# Patient Record
Sex: FEMALE | Race: BLACK OR AFRICAN AMERICAN | NOT HISPANIC OR LATINO | Employment: FULL TIME | URBAN - METROPOLITAN AREA
[De-identification: names, ages, dates, MRNs, and addresses within clinical notes are randomized per-mention and may not be internally consistent; named-entity substitution may affect disease eponyms.]

---

## 2017-02-23 ENCOUNTER — ALLSCRIPTS OFFICE VISIT (OUTPATIENT)
Dept: OTHER | Facility: OTHER | Age: 32
End: 2017-02-23

## 2018-01-10 NOTE — RESULT NOTES
Verified Results  * [facility]PAT[facility] Carlos Alberto PEDRO [id number] 09:07AM Preet Celestin Order Number: [id number]    - Patient Instructions: To schedule this appointment, please contact Central Scheduling at [phone]  Test Name Result Flag Reference   [facility] HEPATOBILIARY W RX (Report)     HEPATOBILIARY SCAN WITH CHOLECYSTOKININ ADMINISTRATION      INDICATION: Right upper quadrant pain, nausea, vomiting     COMPARISON: None available     TECHNIQUE:  Following the intravenous administration of 5 4 mCi Tc-99m labeled mebrofenin, dynamic abdominal images were obtained over a 60 minute time period  Images were performed in AP projection  FINDINGS: Examination is limited by significant motion  There is prompt, uniform accumulation with normal clearance of the radiopharmaceutical by the liver  There is normal filling of the intrahepatic ducts, common bile duct and gallbladder with normal excretion of the radiopharmaceutical into the duodenum  In order to evaluate the contractile response of the gallbladder to cholecystokinin stimulation, 2 3 mcg sincalide (0 02 mcg/kg) was administered by slow intravenous infusion over 60 minutes  These images appear to demonstrate normal contraction of    the gallbladder  The calculated gallbladder ejection fraction is 73 % (N = >35%)  The patient experienced no symptoms after CCK administration  IMPRESSION:     1  Normal hepatobiliary study   2   There is significant motion, however there appears to be normal contractile response of the gallbladder to cholecystokinin infusion  (73%)       Workstation performed: [id number]     Signed by:   Concepción Roque MD   10/7/16

## 2018-01-10 NOTE — MISCELLANEOUS
Message  telephone call to patient to inform of pap smear results and need for f/u appt  in 1 year  Also requested refill of OCP's because she forgot to ask at her appt  Rx  for altavera called to pharmacy for 1 year supply      Active Problems    1  Abdominal pain, epigastric (789 06) (R10 13)   2  Bacterial vaginosis (616 10,041 9) (N76 0,B96 89)   3  Bleeding after intercourse (626 7) (N93 0)   4  Chlamydial infection (079 98) (A74 9)   5  Encounter for contraceptive management (V25 9) (Z30 9)   6  Encounter for routine gynecological examination (V72 31) (Z01 419)   7  GERD without esophagitis (530 81) (K21 9)   8  Obesity due to excess calories, unspecified obesity severity (278 00) (E66 09)   9  Screening for STD (sexually transmitted disease) (V74 5) (Z11 3)   10  Unprotected sexual intercourse (V69 2) (Z72 51)   11  Vaginal bleeding between periods (626 6) (N92 0)   12  Vaginal discharge (623 5) (N89 8)    Current Meds   1  BuPROPion HCl ER (SR) 150 MG Oral Tablet Extended Release 12 Hour; TAKE 1   TABLET TWICE DAILY; Therapy: 00JRF7224 to Recorded   2  Dicyclomine HCl - 20 MG Oral Tablet; TAKE 1 TABLET BY MOUTH EVERY 6 HOURS AS   NEEDED; Therapy: 23Cyj7264 to (Evaluate:13Oct2016)  Requested for: 66Ulc6004; Last   Rx:[id number] Ordered   3  Pantoprazole Sodium 40 MG Oral Tablet Delayed Release; TAKE 1 TABLET TWICE   DAILY 30 MINUTES BEFORE BREAKFAST AND DINNER; Therapy: 97Drc0486 to (Evaluate:30Oct2016)  Requested for: 99Bwm4061; Last   Rx:[id number] Ordered    Allergies    1   No Known Drug Allergies    Signatures   Electronically signed by : Mauro Cespedes RN; Oct 28 2016 10:19AM EST                       (Author)

## 2018-01-12 NOTE — MISCELLANEOUS
Reason For Visit  Reason For Visit Free Text Note Form: SW MET WITH PATIENT TO ADDRESS DEPRESSION SCORE OF 17 WITH NO HARM TO SELF U OTHERS  Case Management Documentation [facility]:   Information obtained from the patient and medical record  Patient's financial status employed  She is also dealing with additional issues such as family crisis  Patient is participating in [facility]  Action Plan: supportive counseling/advocacy, information provided and referral(s) made  Progress Note  WARNER MET WITH 29 Y/O- S- P1- AA ENGLISH SPEAKING WOMAN  PATIENT RESIDES WITH 10 Y/O SON  VERBALIZED IS GRIEVING HER DAD SUDDEN DEATH SEVERAL MONTHS AGO  PATIENT DENIES ANY USAGE OF DRUG, ALCOHOL OR SMOKING  NO MENTAL HEALTH HISTORY OR DV ISSUES  REPORTED HAS GOOD SUPPORT FROM FRIENDS AND OLDER SISTERS  PATIENT RECEPTIVE TO  EVALUATION  ON HER REQUEST ASSISTED TO SCHEDULE APPOINTMENT AT [facility] FOR 11/4 AT 9:30 AM  PATIENT VERY APPRECIATIVE WITH WARNER INTERVENTION  Active Problems    1  Abdominal pain, epigastric (789 06) (R10 13)   2  Bacterial vaginosis (616 10,041 9) (N76 0,B96 89)   3  Bleeding after intercourse (626 7) (N93 0)   4  Chlamydial infection (079 98) (A74 9)   5  Encounter for contraceptive management (V25 9) (Z30 9)   6  Encounter for routine gynecological examination (V72 31) (Z01 419)   7  GERD without esophagitis (530 81) (K21 9)   8  Obesity due to excess calories, unspecified obesity severity (278 00) (E66 09)   9  Screening for STD (sexually transmitted disease) (V74 5) (Z11 3)   10  Unprotected sexual intercourse (V69 2) (Z72 51)   11  Vaginal bleeding between periods (626 6) (N92 0)   12  Vaginal discharge (623 5) (N89 8)    Current Meds   1  BuPROPion HCl ER (SR) 150 MG Oral Tablet Extended Release 12 Hour; TAKE 1   TABLET TWICE DAILY; Therapy: 99TGK7800 to Recorded   2   Dicyclomine HCl - 20 MG Oral Tablet; TAKE 1 TABLET BY MOUTH EVERY 6 HOURS AS   NEEDED; Therapy: 20Lqj2589 to (Evaluate:13Oct2016)  Requested for: 34Vek8719; Last   Rx:[id number] Ordered   3  Pantoprazole Sodium 40 MG Oral Tablet Delayed Release; TAKE 1 TABLET TWICE DAILY   30 MINUTES BEFORE BREAKFAST AND DINNER; Therapy: 60Wnb0811 to (Evaluate:30Oct2016)  Requested for: 11Mxn4685; Last   Rx:[id number] Ordered    Allergies    1   No Known Drug Allergies    Future Appointments    Date/Time Provider Specialty Site   10/28/2016 09:45 AM [facility] 1st Year Resident, Schedule  [facility]  [address]     Signatures   Electronically signed by : DEVIN Land; Oct 14 2016 10:46AM EST                       (Author)

## 2018-01-12 NOTE — MISCELLANEOUS
Message  telephone call to patient  Informed of positive chlamydia and need for treatment  Will pick-up script for zithromax ASAP  Partner to be treated with [facility]  Has 3 month f/u appointment  Will need MERVIN at that appointment  Safe sex practices discussed  Encouraged to complete the labs ordered for additional STD testing      Active Problems    1  Bacterial vaginosis (616 10,041 9) (N76 0,B96 89)   2  Bleeding after intercourse (626 7) (N93 0)   3  Chlamydial infection (079 98) (A74 9)   4  Encounter for contraceptive management (V25 9) (Z30 9)   5  Screening for STD (sexually transmitted disease) (V74 5) (Z11 3)   6  Vaginal bleeding between periods (626 6) (N92 0)   7  Vaginal discharge (623 5) (N89 8)    Current Meds   1  Chateal 0 15-30 MG-MCG Oral Tablet; TAKE 1 TABLET DAILY; Therapy: 17WGA7343 to (Evaluate:04Oct2016)  Requested for: 58Qtt9903; Last   Rx:44Gyr5699 Ordered   2  MetroNIDAZOLE 500 MG Oral Tablet; TAKE 1 TABLET TWICE DAILY UNTIL FINISHED; Therapy: 88WJD5094 to (Evaluate:80Mdf2460)  Requested for: 36Hgh6073; Last   Rx:38Exb6033 Ordered    Allergies    1  No Known Drug Allergies    Plan  Chlamydial infection    · Azithromycin 500 MG Oral Tablet;  Take 2 tablets in single dose    Signatures   Electronically signed by : Shama Hoyos RN; Jul 18 2016 11:25AM EST                       (Author)

## 2018-01-13 VITALS
SYSTOLIC BLOOD PRESSURE: 134 MMHG | BODY MASS INDEX: 41.98 KG/M2 | HEIGHT: 68 IN | WEIGHT: 277 LBS | DIASTOLIC BLOOD PRESSURE: 83 MMHG

## 2018-01-14 NOTE — MISCELLANEOUS
Message  telephone call from patient  States condom broke during intercourse at approx  1700 yesterday  Requesting PlanB  Rx  called to pharmacy  Pt  has scheduled annual exam tomorrow      Active Problems    1  Abdominal pain, epigastric (789 06) (R10 13)   2  Bacterial vaginosis (616 10,041 9) (N76 0,B96 89)   3  Bleeding after intercourse (626 7) (N93 0)   4  Chlamydial infection (079 98) (A74 9)   5  Encounter for contraceptive management (V25 9) (Z30 9)   6  GERD without esophagitis (530 81) (K21 9)   7  Obesity due to excess calories, unspecified obesity severity (278 00) (E66 09)   8  Screening for STD (sexually transmitted disease) (V74 5) (Z11 3)   9  Unprotected sexual intercourse (V69 2) (Z72 51)   10  Vaginal bleeding between periods (626 6) (N92 0)   11  Vaginal discharge (623 5) (N89 8)    Current Meds   1  Dicyclomine HCl - 20 MG Oral Tablet; TAKE 1 TABLET BY MOUTH EVERY 6 HOURS AS   NEEDED; Therapy: 17Msy1653 to (Evaluate:13Oct2016)  Requested for: 21Aux1390; Last   Rx:66Lnn2679 Ordered   2  Monistat 7 Complete Therapy 100-2 MG-% Vaginal Kit; APPLY 1 APPLICATOR Daily; Therapy: 44Wgu7180 to (Evaluate:45Jty9299)  Requested for: 98Utp5049; Last   Rx:34Awv8468 Ordered   3  Pantoprazole Sodium 40 MG Oral Tablet Delayed Release; TAKE 1 TABLET TWICE   DAILY 30 MINUTES BEFORE BREAKFAST AND DINNER; Therapy: 71Kyp0214 to (Evaluate:30Oct2016)  Requested for: 77Jin8015; Last   Rx:90Vmi5631 Ordered   4  Plan B One-Step 1 5 MG Oral Tablet (Levonorgestrel); Therapy: 06PGO3325 to Recorded    Allergies    1   No Known Drug Allergies    Signatures   Electronically signed by : Vikki Watson RN; Oct 13 2016  9:51AM EST                       (Author)

## 2018-01-18 NOTE — MISCELLANEOUS
Message  telephone call from patient  States that partner told her he tested positive for gonorrhea and syphilis as well as chlamydia  Appointment scheduled for additional treatment/testing      Active Problems    1  Bacterial vaginosis (616 10,041 9) (N76 0,B96 89)   2  Bleeding after intercourse (626 7) (N93 0)   3  Chlamydial infection (079 98) (A74 9)   4  Encounter for contraceptive management (V25 9) (Z30 9)   5  Screening for STD (sexually transmitted disease) (V74 5) (Z11 3)   6  Vaginal bleeding between periods (626 6) (N92 0)   7  Vaginal discharge (623 5) (N89 8)    Current Meds   1  Azithromycin 500 MG Oral Tablet (Zithromax); Take two tablets orally at one time; Therapy: 18RXB6359 to (Last Rx:83Hbo5926)  Requested for: 17VFV6711 Ordered   2  Azithromycin 500 MG Oral Tablet; Take 2 tablets in single dose; Therapy: 51XUI4504 to (Last Rx:09Isx2379)  Requested for: 46LIJ2323 Ordered   3  Chateal 0 15-30 MG-MCG Oral Tablet; TAKE 1 TABLET DAILY; Therapy: 33VPX3328 to (Evaluate:04Oct2016)  Requested for: 73Afu7871; Last   Rx:45Nrg6562 Ordered   4  MetroNIDAZOLE 500 MG Oral Tablet; TAKE 1 TABLET TWICE DAILY UNTIL FINISHED; Therapy: 67GCE5237 to (Evaluate:23Rkp7826)  Requested for: 35Jig9673; Last   Rx:61Lgy7429 Ordered    Allergies    1   No Known Drug Allergies    Signatures   Electronically signed by : Pipo Webb RN; Jul 26 2016  2:59PM EST                       (Author)

## 2020-01-17 ENCOUNTER — OFFICE VISIT (OUTPATIENT)
Dept: OBGYN CLINIC | Age: 35
End: 2020-01-17
Payer: COMMERCIAL

## 2020-01-17 VITALS
SYSTOLIC BLOOD PRESSURE: 116 MMHG | BODY MASS INDEX: 41.52 KG/M2 | WEIGHT: 274 LBS | HEIGHT: 68 IN | DIASTOLIC BLOOD PRESSURE: 74 MMHG

## 2020-01-17 DIAGNOSIS — Z11.3 SCREENING FOR STDS (SEXUALLY TRANSMITTED DISEASES): ICD-10-CM

## 2020-01-17 DIAGNOSIS — Z32.02 URINE PREGNANCY TEST NEGATIVE: ICD-10-CM

## 2020-01-17 DIAGNOSIS — Z01.411 ENCOUNTER FOR GYNECOLOGICAL EXAMINATION WITH ABNORMAL FINDING: Primary | ICD-10-CM

## 2020-01-17 DIAGNOSIS — N89.8 VAGINAL ODOR: ICD-10-CM

## 2020-01-17 LAB — SL AMB POCT URINE HCG: NORMAL

## 2020-01-17 PROCEDURE — 87624 HPV HI-RISK TYP POOLED RSLT: CPT | Performed by: NURSE PRACTITIONER

## 2020-01-17 PROCEDURE — G0145 SCR C/V CYTO,THINLAYER,RESCR: HCPCS | Performed by: NURSE PRACTITIONER

## 2020-01-17 PROCEDURE — 99385 PREV VISIT NEW AGE 18-39: CPT | Performed by: NURSE PRACTITIONER

## 2020-01-17 PROCEDURE — 87491 CHLMYD TRACH DNA AMP PROBE: CPT | Performed by: NURSE PRACTITIONER

## 2020-01-17 PROCEDURE — 81025 URINE PREGNANCY TEST: CPT | Performed by: NURSE PRACTITIONER

## 2020-01-17 PROCEDURE — 87591 N.GONORRHOEAE DNA AMP PROB: CPT | Performed by: NURSE PRACTITIONER

## 2020-01-17 RX ORDER — METRONIDAZOLE 7.5 MG/G
1 GEL VAGINAL
Qty: 45 G | Refills: 0 | Status: SHIPPED | OUTPATIENT
Start: 2020-01-17 | End: 2020-01-22

## 2020-01-17 NOTE — PROGRESS NOTES
Diagnoses and all orders for this visit:    Encounter for gynecological examination with abnormal finding  -     Liquid-based pap, screening    Vaginal odor  -     metroNIDAZOLE (METROGEL) 0 75 % vaginal gel; Insert 1 application into the vagina daily at bedtime for 5 days    Urine pregnancy test negative  -     POCT urine HCG    Screening for STDs (sexually transmitted diseases)  -     Chlamydia/GC amplified DNA by PCR  -     Hepatitis B surface antigen  -     Hepatitis C antibody  -     HIV 1/2 AG-AB combo  -     RPR          Calcium/vit d inclusion in the diet discussed, call with any issues, SBE reinforced, all concerns addressed  Pleasant 36 y o  premenopausal female here for annual exam  She STARTED TO HAVE issues with midcycle bleeding since her last menses which concerns her bc she has an abnml pap she never followed up on and also has similar issue when she had chlamydia in 2016  Reports usually she has regular cycles  Last Pap ASCUS 2016,  pap today  Also having vaginal issues with odor and discharge  Denies pelvic pain  Declines a BCM for now, partner with vasectomy but she is not sure she believes him  Sexually active again for the first time in 3yrs and it was UPIC so she requests STD testing   +LIGHT smoker    Past Medical History:   Diagnosis Date    No known health problems      Past Surgical History:   Procedure Laterality Date    NO PAST SURGERIES       Family History   Problem Relation Age of Onset    Breast cancer Mother     Colon cancer Maternal Uncle     Ovarian cancer Neg Hx     Uterine cancer Neg Hx     Cervical cancer Neg Hx      Social History     Tobacco Use    Smoking status: Current Some Day Smoker    Smokeless tobacco: Never Used   Substance Use Topics    Alcohol use: Yes     Frequency: Monthly or less    Drug use: Never       Current Outpatient Medications:     metroNIDAZOLE (METROGEL) 0 75 % vaginal gel, Insert 1 application into the vagina daily at bedtime for 5 days, Disp: 45 g, Rfl: 0  There are no active problems to display for this patient  No Known Allergies    OB History    Para Term  AB Living   2 1 1   1 1   SAB TAB Ectopic Multiple Live Births           1      # Outcome Date GA Lbr David/2nd Weight Sex Delivery Anes PTL Lv   2 AB            1 Term                16 yr old child    Vitals:    20 1618   BP: 116/74   BP Location: Right arm   Patient Position: Sitting   Weight: 124 kg (274 lb)   Height: 5' 8" (1 727 m)     Body mass index is 41 66 kg/m²  Review of Systems   Constitutional: Negative for chills, fatigue, fever and unexpected weight change  Respiratory: Negative for shortness of breath  Gastrointestinal: Negative for anal bleeding, blood in stool, constipation and diarrhea  Genitourinary: Negative for difficulty urinating, dysuria and hematuria  Physical Exam   Constitutional: She appears well-developed and well-nourished  No distress  HENT:   Head: Normocephalic  Neck: Normal range of motion  Neck supple  Pulmonary: Effort normal   Breasts: bilateral without masses, skin changes or nipple discharge  Bilaterally soft and warm to touch  No areas of erythema or pain  Abdominal: Soft  Pelvic exam was performed with patient supine  No labial fusion  There is no rash, tenderness, lesion or injury on the right labia  There is no rash, tenderness, lesion or injury on the left labia  Urethral meatus does not show any tenderness, inflammation or discharge  Palpation of midline bladder without pain or discomfort  Uterus is not deviated, not enlarged, not fixed and not tender  Cervix exhibits no motion tenderness, no discharge and no friability  CERVIX WAS NOT SMOOTH, +CONTACT BLED  Right adnexum displays no mass, no tenderness and no fullness  Left adnexum displays no mass, no tenderness and no fullness  No erythema or tenderness in the vagina  No foreign body in the vagina   No signs of injury around the vagina  No vaginal discharge found  No signs of injury around the vagina or anus  Perineum without lesions, signs of injury, erythema or swelling  Lymphadenopathy:        Right: No inguinal adenopathy present  Left: No inguinal adenopathy present       LIMITED EXAM D/T OBESITY

## 2020-01-17 NOTE — PATIENT INSTRUCTIONS
Vaginal Discharge   WHAT YOU NEED TO KNOW:   Vaginal discharge is normal  It is usually clear or white and odorless  A change in the amount, smell, or color may indicate an underlying problem  Irritation, itching, or burning may also be a sign of a problem  Infection, a foreign body, or chemical irritants can cause abnormal vaginal discharge  Birth control pills, creams, or jellies may also cause abnormal vaginal discharge  DISCHARGE INSTRUCTIONS:   Medicines:   · Medicines  may help fight a fungal or bacterial infection  The medicine may be given as a pill  You may instead get a cream or gel you insert into your vagina  · Take your medicine as directed  Contact your healthcare provider if you think your medicine is not helping or if you have side effects  Tell him of her if you are allergic to any medicine  Keep a list of the medicines, vitamins, and herbs you take  Include the amounts, and when and why you take them  Bring the list or the pill bottles to follow-up visits  Carry your medicine list with you in case of an emergency  Contact your healthcare provider or gynecologist if:   · Your symptoms do not get better in 3 to 5 days  · You have trouble urinating, or you urinate often and with urgency  · You have abdominal pain or cramps  · Your discharge is bloody and it is not your monthly period  · You have pain with sexual intercourse  · You have a fever or chills  · You have low back pain or side pain  · You have questions or concerns about your condition or care  Self-care:   · Clean in and around your vagina with mild soap and warm water each day  Gently dry the area after washing  · Take a sitz bath  Fill a bathtub with 4 to 6 inches of warm water  You may also use a sitz bath pan that fits over a toilet  Sit in the sitz bath for 20 minutes  Do this 2 to 3 times a day, or as directed  The warm water can help decrease pain and swelling       · Do not wear tight-fitting clothes or undergarments  These can make your symptoms worse  · Ask about douching  Douching may help decrease your symptoms  Use a solution of 5 tablespoons of white vinegar mixed with 2 liters of warm water  · Do not have sex until your symptoms go away  Have your partner wear a condom until you complete your course of medication  Follow up with your healthcare provider or gynecologist in 1 week:  Write down your questions so you remember to ask them during your visits  © 2017 [employer] Information is for End User's use only and may not be sold, redistributed or otherwise used for commercial purposes  All illustrations and images included in CareNotes® are the copyrighted property of A D A M [employer]  or [employer]  The above information is an  only  It is not intended as medical advice for individual conditions or treatments  Talk to your doctor, nurse or pharmacist before following any medical regimen to see if it is safe and effective for you

## 2020-01-18 ENCOUNTER — APPOINTMENT (OUTPATIENT)
Dept: LAB | Facility: CLINIC | Age: 35
End: 2020-01-18
Payer: COMMERCIAL

## 2020-01-18 LAB
HBV SURFACE AG SER QL: NORMAL
HCV AB SER QL: NORMAL

## 2020-01-18 PROCEDURE — 87340 HEPATITIS B SURFACE AG IA: CPT | Performed by: NURSE PRACTITIONER

## 2020-01-18 PROCEDURE — 36415 COLL VENOUS BLD VENIPUNCTURE: CPT | Performed by: NURSE PRACTITIONER

## 2020-01-18 PROCEDURE — 86592 SYPHILIS TEST NON-TREP QUAL: CPT | Performed by: NURSE PRACTITIONER

## 2020-01-18 PROCEDURE — 87389 HIV-1 AG W/HIV-1&-2 AB AG IA: CPT | Performed by: NURSE PRACTITIONER

## 2020-01-18 PROCEDURE — 86803 HEPATITIS C AB TEST: CPT | Performed by: NURSE PRACTITIONER

## 2020-01-19 LAB — HIV 1+2 AB+HIV1 P24 AG SERPL QL IA: NORMAL

## 2020-01-20 LAB
HPV HR 12 DNA CVX QL NAA+PROBE: NEGATIVE
HPV16 DNA CVX QL NAA+PROBE: POSITIVE
HPV18 DNA CVX QL NAA+PROBE: NEGATIVE
RPR SER QL: NORMAL

## 2020-01-21 LAB
C TRACH DNA SPEC QL NAA+PROBE: NEGATIVE
N GONORRHOEA DNA SPEC QL NAA+PROBE: NEGATIVE

## 2020-01-22 ENCOUNTER — TELEPHONE (OUTPATIENT)
Dept: OBGYN CLINIC | Facility: CLINIC | Age: 35
End: 2020-01-22

## 2020-01-22 LAB
LAB AP GYN PRIMARY INTERPRETATION: NORMAL
Lab: NORMAL

## 2020-01-22 NOTE — TELEPHONE ENCOUNTER
----- Message from Laura Ramey, [address] Shetty sent at 1/22/2020 12:58 PM EST -----  Please arrange for colposcopy, pap was normal but HPV 16+  Thanks

## 2020-01-22 NOTE — TELEPHONE ENCOUNTER
Spoke with Pt today via phone call  Pt informed that her recent Pap smear test result was "normal", however, HPV test was positive for high risk strain 16 per SEBASTIEN Branch's review of said result  Pt informed of [facility] protocol regarding colposcopy procedure  Pt scheduled for colposcopy with Dr Yoandy Fajardo on 2/4/2020 @ 8:00 am [facility]), instructed to arrive by 7:45 am (Pt expecting menstrual cycle on 1/25/2020)  Pt advised to take OTC Tylenol/Ibuprofen  with a meal 1 hour before procedure to ease cramping pain  [facility] colposcopy letter mailed today to Pt's mailing address in EHR

## 2020-02-04 ENCOUNTER — PROCEDURE VISIT (OUTPATIENT)
Dept: OBGYN CLINIC | Age: 35
End: 2020-02-04
Payer: COMMERCIAL

## 2020-02-04 VITALS
WEIGHT: 284 LBS | HEIGHT: 68 IN | DIASTOLIC BLOOD PRESSURE: 80 MMHG | SYSTOLIC BLOOD PRESSURE: 130 MMHG | BODY MASS INDEX: 43.04 KG/M2

## 2020-02-04 DIAGNOSIS — R87.618 PAP SMEAR ABNORMALITY OF CERVIX/HUMAN PAPILLOMAVIRUS (HPV) POSITIVE: Primary | ICD-10-CM

## 2020-02-04 PROCEDURE — 57454 BX/CURETT OF CERVIX W/SCOPE: CPT | Performed by: STUDENT IN AN ORGANIZED HEALTH CARE EDUCATION/TRAINING PROGRAM

## 2020-02-04 PROCEDURE — 88305 TISSUE EXAM BY PATHOLOGIST: CPT | Performed by: PATHOLOGY

## 2020-02-04 NOTE — PROGRESS NOTES
Pt with history ASCUS, did not follow up appropriately  Now with NILM/HPV 16+ Pap, so for colposcopy     Colposcopy  Date/Time: 2/4/2020 8:35 AM  Performed by: Gisela Julien MD  Authorized by: Gisela Julien MD     Consent:     Consent obtained:  Verbal    Consent given by:  Patient    Procedural risks discussed:  Bleeding, damage to other organs, failure rate, infection and repeat procedure    Patient questions answered: yes      Patient agrees, verbalizes understanding, and wants to proceed: yes      Educational handouts given: yes      Instructions and paperwork completed: yes    Universal protocol:     Patient states understanding of procedure being performed: yes    Pre-procedure:     Pre-procedure timeout performed: yes      Prepped with: acetic acid    Indication:     Indications: as above  Procedure:     Procedure: Colposcopy w/ cervical biopsy and ECC      [place] speculum was placed in the vagina: yes      Under colposcopic examination the transition zone was seen in entirety: yes      Endocervix was curetted using a Kevorkian curette: yes      Cervical biopsy performed with a cervical biopsy punch: yes      Monsel's solution was applied: yes      Biopsy(s): yes      Location:  12, 6, 8, ECC    Specimen to pathology: yes    Post-procedure:     Impression: Low grade cervical dysplasia      Patient tolerance of procedure: Tolerated well, no immediate complications    With metaplasia visible, minimal acetowhite changes, but with punctation at 12, 6, 8 o'clock   Impression CIN2

## 2020-02-11 ENCOUNTER — TELEPHONE (OUTPATIENT)
Dept: OBGYN CLINIC | Facility: CLINIC | Age: 35
End: 2020-02-11

## 2020-02-11 NOTE — TELEPHONE ENCOUNTER
----- Message from Sai Oliva MD sent at 2/11/2020 12:44 PM EST -----  Hi, could you please call Tera Vega to let her know that her result was normal, that she will need a repeat Pap in 1 year, thank you!

## 2021-07-28 ENCOUNTER — APPOINTMENT (OUTPATIENT)
Dept: PHYSICAL THERAPY | Facility: MEDICAL CENTER | Age: 36
End: 2021-07-28

## 2021-07-28 PROCEDURE — 97530 THERAPEUTIC ACTIVITIES: CPT | Performed by: PHYSICAL THERAPIST

## 2022-04-14 ENCOUNTER — OFFICE VISIT (OUTPATIENT)
Dept: OBGYN CLINIC | Facility: MEDICAL CENTER | Age: 37
End: 2022-04-14
Payer: COMMERCIAL

## 2022-04-14 VITALS — DIASTOLIC BLOOD PRESSURE: 74 MMHG | BODY MASS INDEX: 41.97 KG/M2 | WEIGHT: 276 LBS | SYSTOLIC BLOOD PRESSURE: 122 MMHG

## 2022-04-14 DIAGNOSIS — N76.0 BV (BACTERIAL VAGINOSIS): Primary | ICD-10-CM

## 2022-04-14 DIAGNOSIS — Z11.3 SCREENING FOR STDS (SEXUALLY TRANSMITTED DISEASES): ICD-10-CM

## 2022-04-14 DIAGNOSIS — B96.89 BV (BACTERIAL VAGINOSIS): Primary | ICD-10-CM

## 2022-04-14 PROBLEM — N64.52 BREAST DISCHARGE: Status: ACTIVE | Noted: 2017-02-23

## 2022-04-14 PROBLEM — G43.109 MIGRAINE WITH AURA AND WITHOUT STATUS MIGRAINOSUS, NOT INTRACTABLE: Status: ACTIVE | Noted: 2020-08-18

## 2022-04-14 PROBLEM — H53.9 VISION DISTURBANCE: Status: ACTIVE | Noted: 2020-08-18

## 2022-04-14 PROBLEM — G56.03 BILATERAL CARPAL TUNNEL SYNDROME: Status: ACTIVE | Noted: 2020-08-18

## 2022-04-14 PROBLEM — R90.89 MRI OF BRAIN ABNORMAL: Status: ACTIVE | Noted: 2020-08-18

## 2022-04-14 PROBLEM — R76.8 POSITIVE ANA (ANTINUCLEAR ANTIBODY): Status: ACTIVE | Noted: 2021-02-19

## 2022-04-14 PROBLEM — F95.9 TIC DISORDER: Status: ACTIVE | Noted: 2020-08-18

## 2022-04-14 PROCEDURE — 87591 N.GONORRHOEAE DNA AMP PROB: CPT | Performed by: OBSTETRICS & GYNECOLOGY

## 2022-04-14 PROCEDURE — 87491 CHLMYD TRACH DNA AMP PROBE: CPT | Performed by: OBSTETRICS & GYNECOLOGY

## 2022-04-14 PROCEDURE — 99213 OFFICE O/P EST LOW 20 MIN: CPT | Performed by: OBSTETRICS & GYNECOLOGY

## 2022-04-14 RX ORDER — FLUCONAZOLE 150 MG/1
TABLET ORAL
Qty: 2 TABLET | Refills: 0 | Status: SHIPPED | OUTPATIENT
Start: 2022-04-14 | End: 2022-04-18

## 2022-04-14 RX ORDER — FAMOTIDINE 40 MG/1
1 TABLET, FILM COATED ORAL DAILY
COMMUNITY
Start: 2022-03-28 | End: 2022-07-11 | Stop reason: SDUPTHER

## 2022-04-14 RX ORDER — METRONIDAZOLE 7.5 MG/G
1 GEL VAGINAL
Qty: 45 G | Refills: 0 | Status: SHIPPED | OUTPATIENT
Start: 2022-04-14 | End: 2022-04-19

## 2022-04-14 RX ORDER — METHOCARBAMOL 750 MG/1
750 TABLET, FILM COATED ORAL 4 TIMES DAILY PRN
COMMUNITY
Start: 2021-11-11

## 2022-04-14 RX ORDER — FUROSEMIDE 40 MG/1
1 TABLET ORAL DAILY
COMMUNITY
Start: 2021-12-27

## 2022-04-14 RX ORDER — DULOXETIN HYDROCHLORIDE 30 MG/1
30 CAPSULE, DELAYED RELEASE ORAL DAILY
COMMUNITY
Start: 2021-07-20 | End: 2022-07-27

## 2022-04-14 NOTE — PATIENT INSTRUCTIONS
Perineal Hygiene     No soaps or feminine wash to the vulva  Use only water to cleanse, or water with Dove or [employer] if necessary  No lotion to the area  Use only coconut oil for moisture if needed   No douching     Cotton underware, loose fitting clothing  Only perfume-free, dye-free laundry detergent, use a second rinse cycle   Avoid fabric softeners/dryer sheets  Coconut oil as a lubricant (if not using condoms) or another scent-free lubricant (Astroglide, Uberlube) if needed  Partner to avoid the same products as well  Over the counter probiotic to restore vaginal meet may be helpful as well     You may also look into Boric Acid vaginal suppositories to restore vaginal PH balance for up to 2 weeks as directed on the box  You may not use these if you are pregnant    Bacterial Vaginosis   AMBULATORY CARE:   Bacterial vaginosis  is an infection in the vagina  It may cause vaginitis (irritation and inflammation of the vagina)  The cause is not known  Bacteria normally found in the vagina are imbalanced  Your risk increases if you are sexually active, you use a douche, or you have an intrauterine device (IUD)  Common signs and symptoms of bacterial vaginosis:   · White, gray, or yellow vaginal discharge    · Vaginal discharge that smells like fish    · Itching or burning around the outside of your vagina    Call your doctor or gynecologist if:   · Your symptoms come back or do not improve with treatment  · You have vaginal bleeding that is not your monthly period  · You have questions or concerns about your condition or care  Antibiotics  are given to kill the bacteria  They may be given as a pill or as a cream to put in your vagina  Bacterial vaginosis and pregnancy:  If you have bacterial vaginosis during pregnancy, your baby may be born early or have a low birth weight   Your healthcare provider may recommend testing for bacterial vaginosis before or during your pregnancy  He or she will talk to you about your risk for premature delivery, and make sure you know the benefits and risks of testing  Prevent bacterial vaginosis:   · Keep your vaginal area clean and dry  Wear underwear and pantyhose with a cotton crotch  Wipe from front to back after you urinate or have a bowel movement  After you bathe, rinse soap from your vaginal area to decrease your risk for irritation  · Do not use products that cause irritation  Always use unscented tampons or sanitary pads  Do not use feminine sprays, powders, or scented tampons  They may cause irritation and increase your risk for vaginosis  Detergents and fabric softeners may also cause irritation  · Do not use a douche  This can cause an imbalance in healthy vaginal bacteria  · Use latex condoms during sex  This helps prevent another infection and keeps your partner from getting the infection  Follow up with your doctor or gynecologist as directed: Bacterial vaginosis increases the risk for several health problems, such as pelvic inflammatory disease (PID) or sexually transmitted infections  Work with your healthcare providers to schedule regular appointments to check for health problems  Write down your questions so you remember to ask them during your visits  © Copyright [employer] 2022 Information is for End User's use only and may not be sold, redistributed or otherwise used for commercial purposes  All illustrations and images included in CareNotes® are the copyrighted property of [employer]  or [facility]   The above information is an  only  It is not intended as medical advice for individual conditions or treatments  Talk to your doctor, nurse or pharmacist before following any medical regimen to see if it is safe and effective for you  Kegel Exercises for Women   AMBULATORY CARE:   Kegel exercises  help strengthen your pelvic muscles   Pelvic muscles hold your pelvic organs, such as your bladder and uterus, in place  Kegel exercises help prevent or control problems with urine incontinence (leakage)  Incontinence may be caused by pregnancy, childbirth, or menopause  Contact your healthcare provider if:   · You cannot feel your muscles tighten or relax  · You continue to leak urine  · You have questions or concerns about your condition or care  Use the correct muscles:  Pelvic muscles are the muscles you use to control urine flow  To target these muscles, stop and start the flow of urine several times  This will help you become familiar with how it feels to tighten and relax these muscles  How to do Kegel exercises:   · Empty your bladder  You may lie down, stand up, or sit down to do these exercises  When you first try to do these exercises, it may be easier if you lie down  Tighten or squeeze your pelvic muscles slowly  It may feel like you are trying to hold back urine or gas  Hold this position for 3 seconds  Relax for 3 seconds  Repeat this cycle 10 times  · Do 10 sets of Kegel exercises, at least 3 times a day  Do not hold your breath when you do Kegel exercises  Keep your stomach, back, and leg muscles relaxed  · As your muscles get stronger, you will be able to hold the squeeze longer  Your healthcare provider may ask that you increase your pelvic muscle squeeze to 10 seconds  After you squeeze for 10 seconds, relax for 10 seconds  What else you should know:   · Once you know how to do Kegel exercises, use different positions  You can do these exercises while you lie on the floor, sit at your desk or watch TV, and while you stand  · You may notice improved bladder control within about 6 weeks  · Tighten your pelvic muscles before you sneeze, cough, or lift to prevent urine leakage  Follow up with your doctor as directed:  Write down your questions so you remember to ask them during your visits     © Copyright [employer] 2022 Information is for End User's use only and may not be sold, redistributed or otherwise used for commercial purposes  All illustrations and images included in CareNotes® are the copyrighted property of [employer] M [employer]  or Pete Shetty  The above information is an  only  It is not intended as medical advice for individual conditions or treatments  Talk to your doctor, nurse or pharmacist before following any medical regimen to see if it is safe and effective for you

## 2022-04-14 NOTE — PROGRESS NOTES
Assessment/Plan:    No problem-specific Assessment & Plan notes found for this encounter  Diagnoses and all orders for this visit:    BV (bacterial vaginosis)  -     metroNIDAZOLE (METROGEL) 0 75 % vaginal gel; Insert 1 application into the vagina daily at bedtime for 5 days  -     fluconazole (DIFLUCAN) 150 mg tablet; Take one today and 1 in 3 days    Screening for STDs (sexually transmitted diseases)  -     Chlamydia/GC amplified DNA by PCR          Subjective:      Patient ID: Juan Antonio Durbin is a 29 y o  female  29-year-old AA female, presents for vaginal odor patient reports the odor has been on and off for a few months  Patient reports having 1 applicator of Metrogel left at home that use use 2 days ago  Patient denies itching irritation burning or abnormal discharge, denies pelvic pain fever chills or other signs of infection  Patient reports that she is not sexually active at this time  Patient reports having BV in the past   We reviewed perineal hygiene at length  Patient also reports occasional incontinence with sneeze or cough we reviewed Kegel exercises at length instructions provided in after visit summary  Patient reports that her primary medical doctor told her that she should follow-up with gyn in regards to lab work findings that could cause her pain in the future  HSV 1&2 IGM titers weakly positive  HSV 2 IgM 23 6 we discussed these findings  Patient was advised if she has ever been in contact with anyone who has had the HSV virus that it is probable she would test positive as well  Patient advised that does not mean she will have a genital outbreak    We reviewed symptoms of an outbreak she was advised to return to the office this ever occurs for treatment         The following portions of the patient's history were reviewed and updated as appropriate: allergies, current medications, past family history, past medical history, past social history, past surgical history and problem list     Review of Systems   Constitutional: Negative for chills, fatigue and fever  Eyes: Negative for visual disturbance  Respiratory: Negative for cough and shortness of breath  Cardiovascular: Negative for chest pain  Gastrointestinal: Negative for abdominal pain  Genitourinary: Negative for vaginal bleeding and vaginal discharge  Objective:      /74 (BP Location: Left arm, Patient Position: Sitting, Cuff Size: Large)   Wt 125 kg (276 lb)   BMI 41 97 kg/m²          Physical Exam  Vitals and nursing note reviewed  Constitutional:       Appearance: Normal appearance  She is normal weight  HENT:      Head: Normocephalic and atraumatic  Eyes:      Conjunctiva/sclera: Conjunctivae normal    Cardiovascular:      Rate and Rhythm: Normal rate  Pulmonary:      Effort: Pulmonary effort is normal    Genitourinary:     General: Normal vulva  Exam position: Lithotomy position  Taco stage (genital): 5  Labia:         Right: No rash, tenderness, lesion or injury  Left: No rash, tenderness, lesion or injury  Vagina: Normal       Cervix: Normal       Uterus: Normal        Adnexa: Right adnexa normal and left adnexa normal    Musculoskeletal:         General: Normal range of motion  Cervical back: Normal range of motion  Lymphadenopathy:      Lower Body: No right inguinal adenopathy  No left inguinal adenopathy  Skin:     General: Skin is warm and dry  Neurological:      Mental Status: She is alert  Psychiatric:         Mood and Affect: Mood normal          Behavior: Behavior normal          Thought Content: Thought content normal          Judgment: Judgment normal        Metrogel and Diflucan prescribed for BV reviewed instructions for use    Return to the office if symptoms persist  Return for annual exams

## 2022-04-15 LAB
C TRACH DNA SPEC QL NAA+PROBE: NEGATIVE
N GONORRHOEA DNA SPEC QL NAA+PROBE: NEGATIVE

## 2022-04-19 ENCOUNTER — TELEPHONE (OUTPATIENT)
Dept: OBGYN CLINIC | Facility: CLINIC | Age: 37
End: 2022-04-19

## 2022-04-19 DIAGNOSIS — Z20.6 HIV EXPOSURE: ICD-10-CM

## 2022-04-19 DIAGNOSIS — Z72.51 UNPROTECTED SEX: Primary | ICD-10-CM

## 2022-04-19 RX ORDER — EMTRICITABINE AND TENOFOVIR DISOPROXIL FUMARATE 200; 300 MG/1; MG/1
1 TABLET, FILM COATED ORAL DAILY
Qty: 28 TABLET | Refills: 0 | Status: SHIPPED | OUTPATIENT
Start: 2022-04-19 | End: 2022-07-11 | Stop reason: ALTCHOICE

## 2022-04-19 RX ORDER — LEVONORGESTREL 1.5 MG/1
1.5 TABLET ORAL ONCE
Qty: 1 TABLET | Refills: 0 | Status: SHIPPED | OUTPATIENT
Start: 2022-04-19 | End: 2022-04-19
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
## 2022-05-05 ENCOUNTER — OFFICE VISIT (OUTPATIENT)
Dept: OBGYN CLINIC | Facility: MEDICAL CENTER | Age: 37
End: 2022-05-05
Payer: COMMERCIAL

## 2022-05-05 ENCOUNTER — APPOINTMENT (OUTPATIENT)
Dept: LAB | Facility: MEDICAL CENTER | Age: 37
End: 2022-05-05
Payer: COMMERCIAL

## 2022-05-05 VITALS
WEIGHT: 274 LBS | HEIGHT: 68 IN | DIASTOLIC BLOOD PRESSURE: 70 MMHG | BODY MASS INDEX: 41.52 KG/M2 | SYSTOLIC BLOOD PRESSURE: 124 MMHG

## 2022-05-05 DIAGNOSIS — N76.0 BV (BACTERIAL VAGINOSIS): Primary | ICD-10-CM

## 2022-05-05 DIAGNOSIS — B96.89 BV (BACTERIAL VAGINOSIS): Primary | ICD-10-CM

## 2022-05-05 DIAGNOSIS — Z11.3 SCREENING FOR STDS (SEXUALLY TRANSMITTED DISEASES): ICD-10-CM

## 2022-05-05 DIAGNOSIS — Z20.2 STD EXPOSURE: ICD-10-CM

## 2022-05-05 LAB
HBV SURFACE AG SER QL: NORMAL
HCV AB SER QL: NORMAL
RPR SER QL: NORMAL

## 2022-05-05 PROCEDURE — 36415 COLL VENOUS BLD VENIPUNCTURE: CPT

## 2022-05-05 PROCEDURE — 87591 N.GONORRHOEAE DNA AMP PROB: CPT | Performed by: OBSTETRICS & GYNECOLOGY

## 2022-05-05 PROCEDURE — 87340 HEPATITIS B SURFACE AG IA: CPT

## 2022-05-05 PROCEDURE — 87389 HIV-1 AG W/HIV-1&-2 AB AG IA: CPT

## 2022-05-05 PROCEDURE — 86803 HEPATITIS C AB TEST: CPT

## 2022-05-05 PROCEDURE — 99213 OFFICE O/P EST LOW 20 MIN: CPT | Performed by: OBSTETRICS & GYNECOLOGY

## 2022-05-05 PROCEDURE — 86592 SYPHILIS TEST NON-TREP QUAL: CPT

## 2022-05-05 PROCEDURE — 87491 CHLMYD TRACH DNA AMP PROBE: CPT | Performed by: OBSTETRICS & GYNECOLOGY

## 2022-05-05 RX ORDER — METRONIDAZOLE 7.5 MG/G
1 GEL VAGINAL
Qty: 45 G | Refills: 0 | Status: SHIPPED | OUTPATIENT
Start: 2022-05-05 | End: 2022-05-10

## 2022-05-05 RX ORDER — FLUCONAZOLE 150 MG/1
TABLET ORAL
Qty: 2 TABLET | Refills: 0 | Status: SHIPPED | OUTPATIENT
Start: 2022-05-05 | End: 2022-05-08
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
## 2022-05-05 NOTE — PATIENT INSTRUCTIONS
Yeast Infection   AMBULATORY CARE:   A yeast infection , or vaginal candidiasis, is a common vaginal infection  A yeast infection is caused by a fungus, or yeast-like germ  Fungi are normally found in your vagina  Too many fungi can cause an infection  Common signs and symptoms:   · Thick, white, cheese-like discharge from your vagina    · Itching, swelling, and redness in your vagina    · Pain or burning when you urinate    · Pain during sexual intercourse    Call your doctor or gynecologist if:   · You have a fever and chills  · You develop abdominal or pelvic pain  · Your discharge is bloody and it is not your monthly period  · Your signs and symptoms get worse, even after treatment  · You have questions or concerns about your condition or care  Treatment for a yeast infection  includes medicines to treat the fungal infection and decrease inflammation  The medicine may be a pill, cream, ointment, or vaginal tablet or suppository  Keep your vagina healthy:   · Clean your genital area with mild soap and warm water each day  Do not get soap inside your vagina  Gently dry the area after washing  Do not use hot tubs  The heat and moisture from hot tubs can increase your risk for another yeast infection  · Always wipe from front to back  after you use the toilet  This prevents spreading bacteria from your rectal area into your vagina  · Do not wear tight-fitting clothes or undergarments  for long periods of time  Wear cotton underwear during the day  Cotton helps keep your genital area dry and does not hold in warmth or moisture  Do not wear underwear at night  · Do not douche  or use feminine hygiene sprays or bubble bath  Do not use pads or tampons that are scented, or colored or perfumed toilet paper  · Do not have sex until your symptoms go away  Have your partner wear a condom until you complete your course of medication      · Ask your healthcare provider about birth control options if necessary  Condoms have latex and diaphragms have gel that kills sperm  Both of these may irritate your genital area  Follow up with your doctor or gynecologist as directed:  Write down your questions so you remember to ask them during your visits  © Copyright [employer] 2022 Information is for End User's use only and may not be sold, redistributed or otherwise used for commercial purposes  All illustrations and images included in CareNotes® are the copyrighted property of [employer] M [employer] Inc  or Pete Shetty  The above information is an  only  It is not intended as medical advice for individual conditions or treatments  Talk to your doctor, nurse or pharmacist before following any medical regimen to see if it is safe and effective for you  Bacterial Vaginosis   AMBULATORY CARE:   Bacterial vaginosis  is an infection in the vagina  It may cause vaginitis (irritation and inflammation of the vagina)  The cause is not known  Bacteria normally found in the vagina are imbalanced  Your risk increases if you are sexually active, you use a douche, or you have an intrauterine device (IUD)  Common signs and symptoms of bacterial vaginosis:   · White, gray, or yellow vaginal discharge    · Vaginal discharge that smells like fish    · Itching or burning around the outside of your vagina    Call your doctor or gynecologist if:   · Your symptoms come back or do not improve with treatment  · You have vaginal bleeding that is not your monthly period  · You have questions or concerns about your condition or care  Antibiotics  are given to kill the bacteria  They may be given as a pill or as a cream to put in your vagina  Bacterial vaginosis and pregnancy:  If you have bacterial vaginosis during pregnancy, your baby may be born early or have a low birth weight  Your healthcare provider may recommend testing for bacterial vaginosis before or during your pregnancy   He or she will talk to you about your risk for premature delivery, and make sure you know the benefits and risks of testing  Prevent bacterial vaginosis:   · Keep your vaginal area clean and dry  Wear underwear and pantyhose with a cotton crotch  Wipe from front to back after you urinate or have a bowel movement  After you bathe, rinse soap from your vaginal area to decrease your risk for irritation  · Do not use products that cause irritation  Always use unscented tampons or sanitary pads  Do not use feminine sprays, powders, or scented tampons  They may cause irritation and increase your risk for vaginosis  Detergents and fabric softeners may also cause irritation  · Do not use a douche  This can cause an imbalance in healthy vaginal bacteria  · Use latex condoms during sex  This helps prevent another infection and keeps your partner from getting the infection  Follow up with your doctor or gynecologist as directed: Bacterial vaginosis increases the risk for several health problems, such as pelvic inflammatory disease (PID) or sexually transmitted infections  Work with your healthcare providers to schedule regular appointments to check for health problems  Write down your questions so you remember to ask them during your visits  © Copyright [employer] 2022 Information is for End User's use only and may not be sold, redistributed or otherwise used for commercial purposes  All illustrations and images included in CareNotes® are the copyrighted property of [employer]  or Pete Shetty  The above information is an  only  It is not intended as medical advice for individual conditions or treatments  Talk to your doctor, nurse or pharmacist before following any medical regimen to see if it is safe and effective for you

## 2022-05-06 LAB
C TRACH DNA SPEC QL NAA+PROBE: NEGATIVE
HIV 1+2 AB+HIV1 P24 AG SERPL QL IA: NORMAL
N GONORRHOEA DNA SPEC QL NAA+PROBE: NEGATIVE

## 2022-06-29 ENCOUNTER — HOSPITAL ENCOUNTER (EMERGENCY)
Facility: HOSPITAL | Age: 37
Discharge: HOME | End: 2022-06-29
Attending: EMERGENCY MEDICINE
Payer: COMMERCIAL

## 2022-06-29 ENCOUNTER — APPOINTMENT (EMERGENCY)
Dept: RADIOLOGY | Facility: HOSPITAL | Age: 37
End: 2022-06-29
Payer: COMMERCIAL

## 2022-06-29 VITALS
RESPIRATION RATE: 18 BRPM | SYSTOLIC BLOOD PRESSURE: 116 MMHG | OXYGEN SATURATION: 98 % | TEMPERATURE: 97.2 F | DIASTOLIC BLOOD PRESSURE: 74 MMHG | HEART RATE: 71 BPM

## 2022-06-29 DIAGNOSIS — R03.0 ELEVATED BLOOD PRESSURE READING: ICD-10-CM

## 2022-06-29 DIAGNOSIS — R10.9 ABDOMINAL PAIN: Primary | ICD-10-CM

## 2022-06-29 LAB
ALBUMIN SERPL BCP-MCNC: 3 G/DL (ref 3.5–5)
ALP SERPL-CCNC: 95 U/L (ref 46–116)
ALT SERPL W P-5'-P-CCNC: 21 U/L (ref 12–78)
ANION GAP SERPL CALCULATED.3IONS-SCNC: 7 MMOL/L (ref 4–13)
AST SERPL W P-5'-P-CCNC: 37 U/L (ref 5–45)
ATRIAL RATE: 62 BPM
BASOPHILS # BLD AUTO: 0.06 THOUSANDS/ΜL (ref 0–0.1)
BASOPHILS NFR BLD AUTO: 0 % (ref 0–1)
BILIRUB SERPL-MCNC: 0.62 MG/DL (ref 0.2–1)
BUN SERPL-MCNC: 11 MG/DL (ref 5–25)
CALCIUM ALBUM COR SERPL-MCNC: 9.6 MG/DL (ref 8.3–10.1)
CALCIUM SERPL-MCNC: 8.8 MG/DL (ref 8.3–10.1)
CHLORIDE SERPL-SCNC: 105 MMOL/L (ref 100–108)
CO2 SERPL-SCNC: 25 MMOL/L (ref 21–32)
CREAT SERPL-MCNC: 0.75 MG/DL (ref 0.6–1.3)
EOSINOPHIL # BLD AUTO: 0.09 THOUSAND/ΜL (ref 0–0.61)
EOSINOPHIL NFR BLD AUTO: 1 % (ref 0–6)
ERYTHROCYTE [DISTWIDTH] IN BLOOD BY AUTOMATED COUNT: 15.3 % (ref 11.6–15.1)
GFR SERPL CREATININE-BSD FRML MDRD: 103 ML/MIN/1.73SQ M
GLUCOSE SERPL-MCNC: 89 MG/DL (ref 65–140)
HCT VFR BLD AUTO: 39.3 % (ref 34.8–46.1)
HGB BLD-MCNC: 12.2 G/DL (ref 11.5–15.4)
IMM GRANULOCYTES # BLD AUTO: 0.06 THOUSAND/UL (ref 0–0.2)
IMM GRANULOCYTES NFR BLD AUTO: 0 % (ref 0–2)
LIPASE SERPL-CCNC: 84 U/L (ref 73–393)
LYMPHOCYTES # BLD AUTO: 3.35 THOUSANDS/ΜL (ref 0.6–4.47)
LYMPHOCYTES NFR BLD AUTO: 17 % (ref 14–44)
MCH RBC QN AUTO: 25.2 PG (ref 26.8–34.3)
MCHC RBC AUTO-ENTMCNC: 31 G/DL (ref 31.4–37.4)
MCV RBC AUTO: 81 FL (ref 82–98)
MONOCYTES # BLD AUTO: 1.03 THOUSAND/ΜL (ref 0.17–1.22)
MONOCYTES NFR BLD AUTO: 5 % (ref 4–12)
NEUTROPHILS # BLD AUTO: 14.65 THOUSANDS/ΜL (ref 1.85–7.62)
NEUTS SEG NFR BLD AUTO: 77 % (ref 43–75)
NRBC BLD AUTO-RTO: 0 /100 WBCS
P AXIS: 32 DEGREES
PLATELET # BLD AUTO: 383 THOUSANDS/UL (ref 149–390)
PMV BLD AUTO: 10.6 FL (ref 8.9–12.7)
POTASSIUM SERPL-SCNC: 5.5 MMOL/L (ref 3.5–5.3)
PR INTERVAL: 154 MS
PROT SERPL-MCNC: 8.3 G/DL (ref 6.4–8.2)
QRS AXIS: 69 DEGREES
QRSD INTERVAL: 92 MS
QT INTERVAL: 420 MS
QTC INTERVAL: 426 MS
RBC # BLD AUTO: 4.84 MILLION/UL (ref 3.81–5.12)
SODIUM SERPL-SCNC: 137 MMOL/L (ref 136–145)
T WAVE AXIS: 43 DEGREES
VENTRICULAR RATE: 62 BPM
WBC # BLD AUTO: 19.24 THOUSAND/UL (ref 4.31–10.16)

## 2022-06-29 PROCEDURE — 85025 COMPLETE CBC W/AUTO DIFF WBC: CPT | Performed by: EMERGENCY MEDICINE

## 2022-06-29 PROCEDURE — 71046 X-RAY EXAM CHEST 2 VIEWS: CPT

## 2022-06-29 PROCEDURE — 99284 EMERGENCY DEPT VISIT MOD MDM: CPT

## 2022-06-29 PROCEDURE — 93005 ELECTROCARDIOGRAM TRACING: CPT

## 2022-06-29 PROCEDURE — 80053 COMPREHEN METABOLIC PANEL: CPT | Performed by: EMERGENCY MEDICINE

## 2022-06-29 PROCEDURE — 83690 ASSAY OF LIPASE: CPT | Performed by: EMERGENCY MEDICINE

## 2022-06-29 PROCEDURE — 99285 EMERGENCY DEPT VISIT HI MDM: CPT | Performed by: EMERGENCY MEDICINE

## 2022-06-29 PROCEDURE — 93010 ELECTROCARDIOGRAM REPORT: CPT | Performed by: INTERNAL MEDICINE

## 2022-06-29 PROCEDURE — 36415 COLL VENOUS BLD VENIPUNCTURE: CPT | Performed by: EMERGENCY MEDICINE

## 2022-06-29 RX ORDER — PANTOPRAZOLE SODIUM 40 MG/10ML
40 INJECTION, POWDER, LYOPHILIZED, FOR SOLUTION INTRAVENOUS ONCE
Status: DISCONTINUED | OUTPATIENT
Start: 2022-06-29 | End: 2022-06-29

## 2022-06-29 RX ORDER — FAMOTIDINE 20 MG/1
20 TABLET, FILM COATED ORAL ONCE
Status: COMPLETED | OUTPATIENT
Start: 2022-06-29 | End: 2022-06-29

## 2022-06-29 RX ORDER — FAMOTIDINE 20 MG/1
20 TABLET, FILM COATED ORAL 2 TIMES DAILY
Qty: 30 TABLET | Refills: 0 | Status: SHIPPED | OUTPATIENT
Start: 2022-06-29

## 2022-06-29 RX ORDER — SUCRALFATE 1 G/1
1 TABLET ORAL 4 TIMES DAILY
Qty: 60 TABLET | Refills: 0 | Status: SHIPPED | OUTPATIENT
Start: 2022-06-29

## 2022-06-29 RX ORDER — MAGNESIUM HYDROXIDE/ALUMINUM HYDROXICE/SIMETHICONE 120; 1200; 1200 MG/30ML; MG/30ML; MG/30ML
30 SUSPENSION ORAL ONCE
Status: COMPLETED | OUTPATIENT
Start: 2022-06-29 | End: 2022-06-29

## 2022-06-29 RX ORDER — SUCRALFATE 1 G/1
1 TABLET ORAL ONCE
Status: COMPLETED | OUTPATIENT
Start: 2022-06-29 | End: 2022-06-29

## 2022-06-29 RX ADMIN — FAMOTIDINE 20 MG: 20 TABLET ORAL at 16:38

## 2022-06-29 RX ADMIN — ALUMINUM HYDROXIDE, MAGNESIUM HYDROXIDE, AND SIMETHICONE 30 ML: 200; 200; 20 SUSPENSION ORAL at 16:38

## 2022-06-29 RX ADMIN — SUCRALFATE 1 G: 1 TABLET ORAL at 17:24

## 2022-06-29 NOTE — Clinical Note
Zayda Aguila was seen and treated in our emergency department on 6/29/2022  Diagnosis:     Jabier Armendariz  may return to work on return date  She may return on this date: 07/05/2022         If you have any questions or concerns, please don't hesitate to call        Orvilla Duane, RN    ______________________________           _______________          _______________  Hospital Representative                              Date                                Time

## 2022-06-29 NOTE — ED PROVIDER NOTES
Pt Name: Reinaldo Ortega  MRN: [id number]  [place] 1985  Age/Sex: 29 y o  female  Date of evaluation: 6/29/2022  PCP: Serenity Romero MD    CHIEF COMPLAINT    Chief Complaint   Patient presents with    Allergic Reaction     Pt reports starting z pack for UR symptoms, began having abd pain and cramping shortly after, called PCP, instructed to come here for eval           HPI    29 y o  female presenting with concern for possible allergic reaction  Patient states she has been having body aches, cough, runny nose, congestion, and body aches over the past week  She states she was started on a Z-Jay by her primary care doctor, approximately 15 minutes after taking the 1st tab the Zithromax she began having abdominal pain  The pain is sharp, burning, severe, in the epigastric region, radiating throughout the stomach, worse with moving or eating or drinking and better at rest   Patient called her primary care doctor and told to go to the ER for evaluation  She denies shortness of breath, throat swelling, rash, trauma, fevers, other symptoms  Patient notes similar prior episodes, not associated with antibiotic use, about a month ago  She states she had a reassuring CT scan as well as an ultrasound that showed a normal gallbladder, the cause of her symptoms was unclear  HPI      Past Medical and Surgical History    Past Medical History:   Diagnosis Date    No known health problems        Past Surgical History:   Procedure Laterality Date    NO PAST SURGERIES         Family History   Problem Relation Age of Onset    Breast cancer Mother     Colon cancer Maternal Uncle     Ovarian cancer Neg Hx     Uterine cancer Neg Hx     Cervical cancer Neg Hx        Social History     Tobacco Use    Smoking status: Current Some Day Smoker    Smokeless tobacco: Never Used   Substance Use Topics    Alcohol use:  Yes    Drug use: Never           Allergies    No Known Allergies    Home Medications    Prior to Admission medications    Medication Sig Start Date End Date Taking? Authorizing Provider   DULoxetine (CYMBALTA) 30 mg delayed release capsule Take 30 mg by mouth daily 7/20/21 7/20/22  Historical Provider, MD   emtricitabine-tenofovir (TRUVADA) 200-300 mg per tablet Take 1 tablet by mouth daily 4/19/22   SEBASTIEN Caro   famotidine (PEPCID) 40 MG tablet Take 1 tablet by mouth daily 3/28/22   Historical Provider, MD   furosemide (LASIX) 40 mg tablet Take 1 tablet by mouth daily 12/27/21   Historical Provider, MD   methocarbamol (ROBAXIN) 750 mg tablet Take 750 mg by mouth 4 (four) times a day as needed 11/11/21   Historical Provider, MD   raltegravir (ISENTRESS) 400 mg tablet Take 1 tablet (400 mg total) by mouth every 12 (twelve) hours 4/19/22   SEBASTIEN Caro   triamcinolone (KENALOG) 0 1 % ointment Apply topically 2 (two) times a day 5/5/22   SEBASTIEN Caro           Review of Systems    Review of Systems   Constitutional: Negative for activity change, chills and fever  HENT: Negative for drooling and facial swelling  Eyes: Negative for pain, discharge and visual disturbance  Respiratory: Negative for apnea, cough, chest tightness, shortness of breath and wheezing  Cardiovascular: Negative for chest pain and leg swelling  Gastrointestinal: Positive for abdominal pain and nausea  Negative for constipation, diarrhea and vomiting  Genitourinary: Negative for difficulty urinating, dysuria and urgency  Musculoskeletal: Negative for arthralgias, back pain and gait problem  Skin: Negative for color change and rash  Neurological: Negative for dizziness, speech difficulty, weakness and headaches  Psychiatric/Behavioral: Negative for agitation, behavioral problems and confusion  All other systems reviewed and negative      Physical Exam      ED Triage Vitals [06/29/22 1441]   Temperature Pulse Respirations Blood Pressure SpO2   (!) 97 2 °F (36 2 °C) 65 18 Esperanza Oneil 176/82 97 %      Temp Source Heart Rate Source Patient Position - Orthostatic VS BP Location FiO2 (%)   Temporal Monitor Sitting Left arm --      Pain Score       --               Physical Exam  Vitals and nursing note reviewed  Constitutional:       General: She is not in acute distress  Appearance: She is well-developed  She is not ill-appearing, toxic-appearing or diaphoretic  HENT:      Head: Normocephalic and atraumatic  Right Ear: External ear normal       Left Ear: External ear normal    Eyes:      Conjunctiva/sclera: Conjunctivae normal       Pupils: Pupils are equal, round, and reactive to light  Cardiovascular:      Rate and Rhythm: Normal rate and regular rhythm  Heart sounds: Normal heart sounds  Pulmonary:      Effort: Pulmonary effort is normal  No respiratory distress  Breath sounds: Normal breath sounds  No wheezing or rales  Abdominal:      General: There is no distension  Palpations: Abdomen is soft  Tenderness: There is abdominal tenderness  There is no guarding or rebound  Comments: Tender to palpation the epigastric region, negative Schumacher sign, no pain at McBurney's point, no rebound or guarding  Musculoskeletal:         General: No deformity  Normal range of motion  Cervical back: Normal range of motion and neck supple  Skin:     General: Skin is warm and dry  Findings: No erythema or rash  Neurological:      Mental Status: She is alert and oriented to person, place, and time  Psychiatric:         Behavior: Behavior normal          Thought Content:  Thought content normal          Judgment: Judgment normal               Diagnostic Results    EKG Interpretation    Rate:  62  BPM  Rhythm:  Normal Sinus Rhythm   Axis:  Normal   Intervals: Normal, no blocks, QTc  426 ms  Q waves:  No pathologic Q waves   T waves:  Normal   ST segments:  No significant elevations or depressions     Impression:  Normal sinus rhythm without evidence of acute ischemia or significant arrhythmia      EKG for comparison: none available    EKG interpreted by me       Labs:    Results Reviewed     Procedure Component Value Units Date/Time    Comprehensive metabolic panel [[id number]]  (Abnormal) Collected: 06/29/22 1637    Lab Status: Final result Specimen: Blood from Arm, Right Updated: 06/29/22 1706     Sodium 137 mmol/L      Potassium 5 5 mmol/L      Chloride 105 mmol/L      CO2 25 mmol/L      ANION GAP 7 mmol/L      BUN 11 mg/dL      Creatinine 0 75 mg/dL      Glucose 89 mg/dL      Calcium 8 8 mg/dL      Corrected Calcium 9 6 mg/dL      AST 37 U/L      ALT 21 U/L      Alkaline Phosphatase 95 U/L      Total Protein 8 3 g/dL      Albumin 3 0 g/dL      Total Bilirubin 0 62 mg/dL      eGFR 103 ml/min/1 73sq m     Narrative:      [place] guidelines for Chronic Kidney Disease (CKD):     Stage 1 with normal or high GFR (GFR > 90 mL/min/1 73 square meters)    Stage 2 Mild CKD (GFR = 60-89 mL/min/1 73 square meters)    Stage 3A Moderate CKD (GFR = 45-59 mL/min/1 73 square meters)    Stage 3B Moderate CKD (GFR = 30-44 mL/min/1 73 square meters)    Stage 4 Severe CKD (GFR = 15-29 mL/min/1 73 square meters)    Stage 5 End Stage CKD (GFR <15 mL/min/1 73 square meters)  Note: GFR calculation is accurate only with a steady state creatinine    Lipase [[id number]]  (Normal) Collected: 06/29/22 1637    Lab Status: Final result Specimen: Blood from Arm, Right Updated: 06/29/22 1706     Lipase 84 u/L     CBC and differential [[id number]]  (Abnormal) Collected: 06/29/22 1637    Lab Status: Final result Specimen: Blood from Arm, Right Updated: 06/29/22 1644     WBC 19 24 Thousand/uL      RBC 4 84 Million/uL      Hemoglobin 12 2 g/dL      Hematocrit 39 3 %      MCV 81 fL      MCH 25 2 pg      MCHC 31 0 g/dL      RDW 15 3 %      MPV 10 6 fL      Platelets 456 Thousands/uL      nRBC 0 /100 WBCs      Neutrophils Relative 77 %      Immat GRANS % 0 % Lymphocytes Relative 17 %      Monocytes Relative 5 %      Eosinophils Relative 1 %      Basophils Relative 0 %      Neutrophils Absolute 14 65 Thousands/µL      Immature Grans Absolute 0 06 Thousand/uL      Lymphocytes Absolute 3 35 Thousands/µL      Monocytes Absolute 1 03 Thousand/µL      Eosinophils Absolute 0 09 Thousand/µL      Basophils Absolute 0 06 Thousands/µL           All labs reviewed and utilized in the medical decision making process    Radiology:    XR chest 2 views   ED Interpretation   No acute cardiopulmonary process or osseous abnormality  All radiology studies independently viewed by me and interpreted by the radiologist     Procedure    Procedures        ED Course of Care and Re-Assessments      Labs remarkable for leukocytosis and elevated potassium, elevated potassium likely secondary to hemolysis  On review of EMR, patient had leukocytosis of 19,000 on last visit at [facility] last month, also noted to be persistently high in other measurements  Patient states that this is a known issue, states she was sent to a rheumatologist for further workup with concern for possible lupus or other cause, states she has never found a reason for her high white blood cell count but it is a chronic issue  Symptoms improved substantially with treatment as below  Medications   famotidine (PEPCID) tablet 20 mg (20 mg Oral Given 6/29/22 1638)   aluminum-magnesium hydroxide-simethicone (MYLANTA) oral suspension 30 mL (30 mL Oral Given 6/29/22 1638)   sucralfate (CARAFATE) tablet 1 g (1 g Oral Given 6/29/22 1724)           FINAL IMPRESSION    Final diagnoses:   Abdominal pain   Elevated blood pressure reading         DISPOSITION/PLAN    Presentation as above with abdominal pain as well as elevated blood pressure reading that resolved with pain control  Vital signs otherwise reassuring, examination remarkable for tenderness to palpation the abdomen    Patient underwent extensive workup to include ultrasound and CT last month for similar symptoms without a diagnosis being made  Patient does have a history of GERD, was previously scoped, last about 5-6 years ago  At this time, very low suspicion for anaphylaxis or generalized allergic reaction, no systems involved other than GI  Overall, felt much more consistent with gastritis or an ulcer aggravated by ingestion of the medication  Gallbladder pathology not ruled out but based on exam and history as well as recent negative workup not pursued again in ER at this time, patient okay with this plan  Discharged strict return precautions, referred to Gastroenterology for further workup, also referred to hematology for further workup of persistent leukocytosis  Hemodynamically stable and comfortable at time of discharge  Time reflects when diagnosis was documented in both MDM as applicable and the Disposition within this note     Time User Action Codes Description Comment    6/29/2022  6:19 PM Keely LOPEZ Add [R10 9] Abdominal pain     6/29/2022  6:19 PM Keely LOPEZ Add [R03 0] Elevated blood pressure reading       ED Disposition     ED Disposition   Discharge    Condition   Stable    Date/Time   Wed Jun 29, 2022  6:19 PM    Comment   Nereida Vail discharge to home/self care                 Follow-up Information     Follow up With Specialties Details Why Contact Info Additional [address] [facility] Emergency Department Emergency Medicine Go to  If symptoms worsen [address] [facility] [id number] [facility] Emergency Department, [address] [place] [address], Melissa Das MD Internal Medicine Call in 1 day To discuss this visit and schedule close outpatient follow-up [address] [place] [address] G[facility] Specialists [place] [facility] Call in 1 day To discuss further workup for your abdominal pain  Consider workup to help rule out gastritis or an ulcer  [address] [address] [facility] Gastroenterology Specialists [facility], [address], [place], [place], [place], [address]    [facility] Hematology [facility] [place] [facility] Call in 1 day To discuss further workup for your persistently elevated white blood cell count [address] [facility]  [address] [facility] Hematology On[facility] [place], [address], [place], [place], [address] [phone]            PATIENT REFERRED TO:    [facility] Emergency Department  [address] [facility] [address] [phone]  Go to   If symptoms worsen    Luciano Goltz, MD  [address] [address]  [phone]    Call in 1 day  To discuss this visit and schedule close outpatient follow-up    [facility] Gastroenterology Specialists [facility] [address] [facility] [address] [facility] [address]  Call in 1 day  To discuss further workup for your abdominal pain  Consider workup to help rule out gastritis or an ulcer      [facility] Hematology [facility] [place]  [address] [facility] [address] [address] [phone]  Call in 1 day  To discuss further workup for your persistently elevated white blood cell count      DISCHARGE MEDICATIONS:    Patient's Medications   Discharge Prescriptions    FAMOTIDINE (PEPCID) 20 MG TABLET    Take 1 tablet (20 mg total) by mouth 2 (two) times a day       Start Date: 2022 End Date: --       Order Dose: 20 mg       Quantity: 30 tablet    Refills: 0    SUCRALFATE (CARAFATE) 1 G TABLET    Take 1 tablet (1 g total) by mouth 4 (four) times a day       Start Date: 6/29/2022 End Date: --       Order Dose: 1 g       Quantity: 60 tablet    Refills: 0       No discharge procedures on file  Valeri Bosch MD    Portions of the record may have been created with voice recognition software  Occasional wrong word or "sound alike" substitutions may have occurred due to the inherent limitations of voice recognition software    Please read the chart carefully and recognize, using context, where substitutions have occurred     Valeri Bosch MD  06/29/22 9575

## 2022-07-08 RX ORDER — FLUTICASONE PROPIONATE 50 MCG
2 SPRAY, SUSPENSION (ML) NASAL DAILY
COMMUNITY
Start: 2022-06-23

## 2022-07-08 RX ORDER — BENZOCAINE AND MENTHOL, UNSPECIFIED FORM 15; 2.3 MG/1; MG/1
1 LOZENGE ORAL EVERY 2 HOUR PRN
COMMUNITY
Start: 2022-06-23 | End: 2022-07-11 | Stop reason: ALTCHOICE

## 2022-07-08 RX ORDER — HYDROXYZINE HYDROCHLORIDE 25 MG/1
25 TABLET, FILM COATED ORAL EVERY 6 HOURS PRN
COMMUNITY
Start: 2022-06-23

## 2022-07-11 ENCOUNTER — OFFICE VISIT (OUTPATIENT)
Dept: INTERNAL MEDICINE CLINIC | Facility: CLINIC | Age: 37
End: 2022-07-11
Payer: COMMERCIAL

## 2022-07-11 VITALS
SYSTOLIC BLOOD PRESSURE: 138 MMHG | TEMPERATURE: 98.5 F | HEART RATE: 73 BPM | DIASTOLIC BLOOD PRESSURE: 84 MMHG | OXYGEN SATURATION: 98 % | WEIGHT: 274.4 LBS | RESPIRATION RATE: 16 BRPM | HEIGHT: 68 IN | BODY MASS INDEX: 41.59 KG/M2

## 2022-07-11 DIAGNOSIS — Z13.1 SCREENING FOR DIABETES MELLITUS (DM): ICD-10-CM

## 2022-07-11 DIAGNOSIS — F17.200 SMOKING: Primary | ICD-10-CM

## 2022-07-11 DIAGNOSIS — Z13.29 SCREENING FOR THYROID DISORDER: ICD-10-CM

## 2022-07-11 DIAGNOSIS — Z80.3 FAMILY HISTORY OF BREAST CANCER IN FIRST DEGREE RELATIVE: ICD-10-CM

## 2022-07-11 DIAGNOSIS — M25.50 ARTHRALGIA, UNSPECIFIED JOINT: ICD-10-CM

## 2022-07-11 DIAGNOSIS — G43.109 MIGRAINE WITH AURA AND WITHOUT STATUS MIGRAINOSUS, NOT INTRACTABLE: ICD-10-CM

## 2022-07-11 DIAGNOSIS — K21.9 GERD WITHOUT ESOPHAGITIS: ICD-10-CM

## 2022-07-11 DIAGNOSIS — R21 RASH: ICD-10-CM

## 2022-07-11 DIAGNOSIS — R63.5 WEIGHT GAIN: ICD-10-CM

## 2022-07-11 DIAGNOSIS — R79.9 ABNORMAL BLOOD CHEMISTRY LEVEL: ICD-10-CM

## 2022-07-11 PROCEDURE — 99204 OFFICE O/P NEW MOD 45 MIN: CPT

## 2022-07-11 RX ORDER — HYDROCORTISONE 25 MG/ML
LOTION TOPICAL 2 TIMES DAILY
Qty: 59 ML | Refills: 0 | Status: SHIPPED | OUTPATIENT
Start: 2022-07-11

## 2022-07-11 NOTE — PROGRESS NOTES
INTERNAL MEDICINE INITIAL OFFICE VISIT  [facility] - [facility] DHAVAL RICHARD    NAME: Rehan Andrade  AGE: 29 y o  SEX: female  : 1985     DATE: 2022     Assessment and Plan:   1  GERD without esophagitis  Taking Pepcid 20 mg twice a day with relief  She has an appointment with GI on   2  Abnormal blood chemistry level  Microcytic anemia, elevated white count history  Has follow-up with Hematology   No new labs ordered in regards to these conditions due to appointment coming up  Intermittent pains and fatigue    3  Migraine with aura and without status migrainosus, not intractable  Stable  4  Arthralgia, unspecified joint  Right hand 3rd digit swollen, pain  No trauma reported  ? Elevated sed in past  Will repeat  - Vitamin B12; Future  - XR hand 3+ vw right; Future    5  Smoking  Intermittent smoking  We discussed she should quit since it is not a daily thing she should be able to have an easier time  6  Rash  Lower abdominal   Acute onset  Resolving will provide with some lotion  There is no pattern  - hydrocortisone 2 5 % lotion; Apply topically 2 (two) times a day  Dispense: 59 mL; Refill: 0    7  Lower back pain  Went through physical therapy  Was supposed to have an MRI scheduled however she switched physicians  I discussed with her that we will address her lower back issues after we have results from Hematology  She agrees with this plan  She is taking Robaxin which is helpful  8  First-degree relative/mom history of breast cancer diagnosed at age 28  No genetic testing was completed  Will refer to genetic Oncology as well as obtain a mammogram due to high risk    BMI Counseling: Body mass index is 41 72 kg/m²  The BMI is above normal  Nutrition recommendations include decreasing portion sizes, encouraging healthy choices of fruits and vegetables, limiting drinks that contain sugar and moderation in carbohydrate intake  Exercise recommendations include exercising 3-5 times per week  No pharmacotherapy was ordered  Rationale for BMI follow-up plan is due to patient being overweight or obese  Depression Screening and Follow-up Plan: Patient's depression screening was positive with a PHQ-2 score of 5  Their PHQ-9 score was 13  Return in about 5 weeks (around 8/17/2022)  After visit with Hematology as well as GI     Chief Complaint:     Chief Complaint   Patient presents with    Establish Care      History of Present Illness:     Patient presents today in the office to establish care  She has several issues she wants to review  She has been seen at [facility] for symptoms of upper respiratory illness, lower back pain, pain to her right leg  Over the last year she has been through physical therapy with about 20% improvement to her lower back  She went to [facility] Room on 06/29 she was found to have an elevated white count, elevated potassium, microcytic anemia  She was referred to heme/onc, her appointment is August 15th  She was also referred to GI which her appointment is July 20th  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  Her mother had a history of breast cancer diagnosed at age 28  There was no genetic testing done at that time  Patient reports doing self-breast checks  Father had lung cancer however was a smoker    The following portions of the patient's history were reviewed and updated as appropriate: allergies, current medications, past family history, past medical history, past social history, past surgical history and problem list      Review of Systems:     Review of Systems   Constitutional: Negative for appetite change, chills, diaphoresis, fatigue, fever and unexpected weight change  HENT: Negative for postnasal drip and sneezing  Eyes: Negative for visual disturbance  Respiratory: Negative for chest tightness and shortness of breath      Cardiovascular: Negative for chest pain, palpitations and leg swelling  Gastrointestinal: Negative for abdominal pain and blood in stool  Endocrine: Negative for cold intolerance, heat intolerance, polydipsia, polyphagia and polyuria  Genitourinary: Negative for difficulty urinating, dysuria, frequency and urgency  Musculoskeletal: Negative for arthralgias and myalgias  Skin: Positive for rash  Negative for wound  Neurological: Negative for dizziness, weakness, light-headedness and headaches  Hematological: Negative for adenopathy  Psychiatric/Behavioral: Negative for confusion, dysphoric mood and sleep disturbance  The patient is not nervous/anxious  Past Medical History:     Past Medical History:   Diagnosis Date    Anxiety     Depression     GERD (gastroesophageal reflux disease)     Hypertension     No known health problems         Past Surgical History:     Past Surgical History:   Procedure Laterality Date    NO PAST SURGERIES          Social History:     Social History     Socioeconomic History    Marital status: Single     Spouse name: None    Number of children: None    Years of education: None    Highest education level: None   Occupational History    None   Tobacco Use    Smoking status: Current Some Day Smoker    Smokeless tobacco: Never Used   Substance and Sexual Activity    Alcohol use: Yes    Drug use: Never    Sexual activity: Not Currently     Partners: Male   Other Topics Concern    None   Social History Narrative    None     Social Determinants of Health     Financial Resource Strain: Not on file   Food Insecurity: Not on file   Transportation Needs: Not on file   Physical Activity: Insufficiently Active    Days of Exercise per Week: 2 days    Minutes of Exercise per Session: 60 min   Stress: Stress Concern Present    Feeling of Stress :  To some extent   Social Connections: Not on file   Intimate Partner Violence: Not on file   Housing Stability: Not on file         Family History:     Family History   Problem Relation Age of Onset    Breast cancer Mother     Colon cancer Maternal Uncle     Ovarian cancer Neg Hx     Uterine cancer Neg Hx     Cervical cancer Neg Hx         Current Medications:     Current Outpatient Medications:     DULoxetine (CYMBALTA) 30 mg delayed release capsule, Take 30 mg by mouth daily, Disp: , Rfl:     fluticasone (FLONASE) 50 mcg/act nasal spray, 2 sprays into each nostril daily, Disp: , Rfl:     furosemide (LASIX) 40 mg tablet, Take 1 tablet by mouth daily, Disp: , Rfl:     hydrocortisone 2 5 % lotion, Apply topically 2 (two) times a day, Disp: 59 mL, Rfl: 0    hydrOXYzine HCL (ATARAX) 25 mg tablet, Take 25 mg by mouth every 6 (six) hours as needed, Disp: , Rfl:     methocarbamol (ROBAXIN) 750 mg tablet, Take 750 mg by mouth 4 (four) times a day as needed, Disp: , Rfl:     sucralfate (CARAFATE) 1 g tablet, Take 1 tablet (1 g total) by mouth 4 (four) times a day, Disp: 60 tablet, Rfl: 0    triamcinolone (KENALOG) 0 1 % ointment, Apply topically 2 (two) times a day, Disp: 30 g, Rfl: 0    famotidine (PEPCID) 20 mg tablet, Take 1 tablet (20 mg total) by mouth 2 (two) times a day, Disp: 30 tablet, Rfl: 0     Allergies: Allergies   Allergen Reactions    Azithromycin GI Intolerance        Physical Exam:     /84 (BP Location: Left arm, Patient Position: Sitting, Cuff Size: Large)   Pulse 73   Temp 98 5 °F (36 9 °C) (Tympanic)   Resp 16   Ht 5' 8" (1 727 m)   Wt 124 kg (274 lb 6 4 oz)   SpO2 98%   BMI 41 72 kg/m²     Physical Exam  Constitutional:       Appearance: She is well-developed  HENT:      Head: Normocephalic and atraumatic  Eyes:      Pupils: Pupils are equal, round, and reactive to light  Neck:      Thyroid: No thyromegaly  Cardiovascular:      Rate and Rhythm: Normal rate and regular rhythm  Heart sounds: No murmur heard  Pulmonary:      Effort: Pulmonary effort is normal       Breath sounds: Normal breath sounds     Abdominal: General: Bowel sounds are normal       Palpations: Abdomen is soft  Musculoskeletal:         General: Normal range of motion  Cervical back: Normal range of motion and neck supple  Lymphadenopathy:      Cervical: No cervical adenopathy  Skin:     General: Skin is warm and dry  Findings: Rash present  Neurological:      Mental Status: She is alert and oriented to person, place, and time  Motor: Weakness present  Data:     Laboratory Results: I have personally reviewed the pertinent laboratory results/reports   Radiology/Other Diagnostic Testing Results: I have personally reviewed pertinent reports        SEBASTIEN Vilchis  [facility]

## 2022-07-11 NOTE — LETTER
July 11, 2022     Patient: Zayda Aguila  YOB: 1985  Date of Visit: 7/11/2022      To Whom it May Concern:    Zayda Aguila is under my professional care  Jabier Armendariz was seen in my office on 7/11/2022  Jabier Armendariz may return to work on 07/11/2022  If you have any questions or concerns, please don't hesitate to call           Sincerely,          SEBASTIEN Oneal        CC: No Recipients

## 2022-07-13 ENCOUNTER — TELEPHONE (OUTPATIENT)
Dept: GENETICS | Facility: CLINIC | Age: 37
End: 2022-07-13

## 2022-07-13 NOTE — TELEPHONE ENCOUNTER
I called April to schedule a new patient appointment with the [facility]       Outcome:   I left a voice message encouraging the patient to call the genetics team at [phone] to schedule this appointment  Follow-up:   At this time the referral will be closed and we will wait to hear back from the patient regarding scheduling this appointment

## 2022-07-15 ENCOUNTER — TELEPHONE (OUTPATIENT)
Dept: HEMATOLOGY ONCOLOGY | Facility: CLINIC | Age: 37
End: 2022-07-15

## 2022-07-15 NOTE — TELEPHONE ENCOUNTER
LVM to the patient in regards to her being rescheduled sooner for new patient appt  Informed patient that I was able to get her on the scheduled for 7/27/22 at 1:00pm with Jaymie Persaud at the [facility] office  Informed patient if this appt does not work for her she can give the office a call back to reschedule at [phone]

## 2022-07-20 ENCOUNTER — OFFICE VISIT (OUTPATIENT)
Dept: GASTROENTEROLOGY | Facility: CLINIC | Age: 37
End: 2022-07-20
Payer: COMMERCIAL

## 2022-07-20 VITALS
DIASTOLIC BLOOD PRESSURE: 80 MMHG | WEIGHT: 276 LBS | SYSTOLIC BLOOD PRESSURE: 118 MMHG | HEART RATE: 67 BPM | BODY MASS INDEX: 41.83 KG/M2 | HEIGHT: 68 IN | OXYGEN SATURATION: 99 %

## 2022-07-20 DIAGNOSIS — R11.0 NAUSEA: ICD-10-CM

## 2022-07-20 DIAGNOSIS — K21.00 GASTROESOPHAGEAL REFLUX DISEASE WITH ESOPHAGITIS, UNSPECIFIED WHETHER HEMORRHAGE: ICD-10-CM

## 2022-07-20 DIAGNOSIS — R10.13 EPIGASTRIC PAIN: Primary | ICD-10-CM

## 2022-07-20 PROCEDURE — 99204 OFFICE O/P NEW MOD 45 MIN: CPT | Performed by: PHYSICIAN ASSISTANT

## 2022-07-20 RX ORDER — PANTOPRAZOLE SODIUM 40 MG/1
40 TABLET, DELAYED RELEASE ORAL 2 TIMES DAILY
Qty: 60 TABLET | Refills: 1 | Status: SHIPPED | OUTPATIENT
Start: 2022-07-20 | End: 2022-08-19

## 2022-07-20 NOTE — PROGRESS NOTES
[facility] - Outpatient Consultation  Sofie Dean 29 y o  female MRN: [id number]  Encounter: [id number]          ASSESSMENT AND PLAN:      1  Gastroesophageal reflux disease  2  Epigastric pain  3  Nausea    Patient presents for an evaluation of burning epigastric pain, reflux, heartburn, and nausea/vomiting since May  She has a long history of GERD x years  She reports insufficient relief on Pantoprazole daily and Pepcid  She had an ultrasound which showed a normal gallbladder  Will plan for EGD to investigate for PUD, gastritis, esophagitis, hiatal hernia, bx for h pylori, etc   Will begin Pantoprazole 40mg po BID x 8 weeks  GERD modifications reviewed with patient  Follow up in 4-6 weeks  If EGD negative and not improved on the PPI BID, will plan for CT Scan A/P and gastric emptying study  ______________________________________________________________________    HPI:  Patient is a pleasant 29year old female who presents to the office for a gastrointestinal evaluation  Patient reports that she has been struggling with burning epigastric discomfort, reflux, and nausea since May  She also has had episodes of vomiting  She had an ultrasound which showed a normal gallbladder  She denies frequent NSAIDs  No melena or rectal bleeding  She reports insufficient relief on Pantoprazole daily and Pepcid  She previously had an EGD in 2016 which was normal   She reports intentional weight loss through dieting  No family history of esophageal, stomach, or pancreatic cancer  She reports an uncle with colon cancer  She reports she will also be seeing a hematologist for evaluation of an ongoing elevated WBC count  REVIEW OF SYSTEMS:    CONSTITUTIONAL: Denies any fever, chills, rigors, and weight loss  HEENT: No earache or tinnitus  Denies hearing loss or visual disturbances  CARDIOVASCULAR: No chest pain or palpitations     RESPIRATORY: Denies any cough, hemoptysis, shortness of breath or dyspnea on exertion  GASTROINTESTINAL: As noted in the History of Present Illness  GENITOURINARY: No problems with urination  Denies any hematuria or dysuria  NEUROLOGIC: No dizziness or vertigo, denies headaches  MUSCULOSKELETAL: Denies any muscle or joint pain  SKIN: Denies skin rashes or itching  ENDOCRINE: Denies excessive thirst  Denies intolerance to heat or cold  PSYCHOSOCIAL: Denies depression or anxiety  Denies any recent memory loss  Historical Information   Past Medical History:   Diagnosis Date    Anxiety     Depression     GERD (gastroesophageal reflux disease)     Hypertension     No known health problems      Past Surgical History:   Procedure Laterality Date    NO PAST SURGERIES       Social History   Social History     Substance and Sexual Activity   Alcohol Use Yes     Social History     Substance and Sexual Activity   Drug Use Never     Social History     Tobacco Use   Smoking Status Current Some Day Smoker   Smokeless Tobacco Never Used     Family History   Problem Relation Age of Onset    Breast cancer Mother     Colon cancer Maternal Uncle     Ovarian cancer Neg Hx     Uterine cancer Neg Hx     Cervical cancer Neg Hx        Meds/Allergies       Current Outpatient Medications:     DULoxetine (CYMBALTA) 30 mg delayed release capsule    famotidine (PEPCID) 20 mg tablet    fluticasone (FLONASE) 50 mcg/act nasal spray    furosemide (LASIX) 40 mg tablet    hydrocortisone 2 5 % lotion    hydrOXYzine HCL (ATARAX) 25 mg tablet    methocarbamol (ROBAXIN) 750 mg tablet    pantoprazole (PROTONIX) 40 mg tablet    sucralfate (CARAFATE) 1 g tablet    triamcinolone (KENALOG) 0 1 % ointment    Allergies   Allergen Reactions    Azithromycin GI Intolerance           Objective     Blood pressure 118/80, pulse 67, height 5' 8" (1 727 m), weight 125 kg (276 lb), SpO2 99 %, not currently breastfeeding  Body mass index is 41 97 kg/m²          PHYSICAL EXAM: General Appearance:   Alert, cooperative, no distress   HEENT:   Normocephalic, atraumatic, anicteric     Neck:  Supple, symmetrical, trachea midline   Lungs:   Clear to auscultation bilaterally; no rales, rhonchi or wheezing; respirations unlabored    Heart[de-identified]   Regular rate and rhythm; no murmur, rub, or gallop  Abdomen:   Soft, mild epigastric TTP, non-distended; normal bowel sounds; no masses, no organomegaly    Genitalia:   Deferred    Rectal:   Deferred    Extremities:  No cyanosis, clubbing or edema    Pulses:  2+ and symmetric    Skin:  No jaundice, rashes, or lesions    Lymph nodes:  No palpable cervical lymphadenopathy        Lab Results:   No visits with results within 1 Day(s) from this visit     Latest known visit with results is:   Admission on 06/29/2022, Discharged on 06/29/2022   Component Date Value    WBC 06/29/2022 19 24 (A)    RBC 06/29/2022 4 84     Hemoglobin 06/29/2022 12 2     Hematocrit 06/29/2022 39 3     MCV 06/29/2022 81 (A)    MCH 06/29/2022 25 2 (A)    MCHC 06/29/2022 31 0 (A)    RDW 06/29/2022 15 3 (A)    MPV 06/29/2022 10 6     Platelets 29/81/9186 383     nRBC 06/29/2022 0     Neutrophils Relative 06/29/2022 77 (A)    Immat GRANS % 06/29/2022 0     Lymphocytes Relative 06/29/2022 17     Monocytes Relative 06/29/2022 5     Eosinophils Relative 06/29/2022 1     Basophils Relative 06/29/2022 0     Neutrophils Absolute 06/29/2022 14 65 (A)    Immature Grans Absolute 06/29/2022 0 06     Lymphocytes Absolute 06/29/2022 3 35     Monocytes Absolute 06/29/2022 1 03     Eosinophils Absolute 06/29/2022 0 09     Basophils Absolute 06/29/2022 0 06     Sodium 06/29/2022 137     Potassium 06/29/2022 5 5 (A)    Chloride 06/29/2022 105     CO2 06/29/2022 25     ANION GAP 06/29/2022 7     BUN 06/29/2022 11     Creatinine 06/29/2022 0 75     Glucose 06/29/2022 89     Calcium 06/29/2022 8 8     Corrected Calcium 06/29/2022 9 6     AST 06/29/2022 37     ALT 06/29/2022 21     Alkaline Phosphatase 06/29/2022 95     Total Protein 06/29/2022 8 3 (A)    Albumin 06/29/2022 3 0 (A)    Total Bilirubin 06/29/2022 0 62     eGFR 06/29/2022 103     Lipase 06/29/2022 84     Ventricular Rate 06/29/2022 62     Atrial Rate 06/29/2022 62     ID Interval 06/29/2022 154     QRSD Interval 06/29/2022 92     QT Interval 06/29/2022 420     QTC Interval 06/29/2022 426     P Axis 06/29/2022 32     QRS Axis 06/29/2022 69     T Wave Axis 06/29/2022 43          Radiology Results:   XR chest 2 views    Result Date: 6/30/2022  Narrative: CHEST INDICATION:   cough  COMPARISON:  None EXAM PERFORMED/VIEWS:  XR CHEST PA & LATERAL Images: 2 FINDINGS: Cardiomediastinal silhouette appears unremarkable  The lungs are clear  No pneumothorax or pleural effusion  Osseous structures appear within normal limits for patient age  Impression: No acute cardiopulmonary disease   Workstation performed: [id number]

## 2022-07-26 ENCOUNTER — TELEPHONE (OUTPATIENT)
Dept: HEMATOLOGY ONCOLOGY | Facility: CLINIC | Age: 37
End: 2022-07-26

## 2022-07-27 ENCOUNTER — CONSULT (OUTPATIENT)
Dept: HEMATOLOGY ONCOLOGY | Facility: CLINIC | Age: 37
End: 2022-07-27
Payer: COMMERCIAL

## 2022-07-27 ENCOUNTER — APPOINTMENT (OUTPATIENT)
Dept: LAB | Facility: CLINIC | Age: 37
End: 2022-07-27
Payer: COMMERCIAL

## 2022-07-27 VITALS
SYSTOLIC BLOOD PRESSURE: 110 MMHG | DIASTOLIC BLOOD PRESSURE: 80 MMHG | HEART RATE: 71 BPM | HEIGHT: 68 IN | BODY MASS INDEX: 41.07 KG/M2 | WEIGHT: 271 LBS | OXYGEN SATURATION: 99 % | TEMPERATURE: 98.1 F | RESPIRATION RATE: 16 BRPM

## 2022-07-27 DIAGNOSIS — D72.9 NEUTROPHILIA: ICD-10-CM

## 2022-07-27 DIAGNOSIS — D72.828 OTHER ELEVATED WHITE BLOOD CELL (WBC) COUNT: ICD-10-CM

## 2022-07-27 DIAGNOSIS — D47.1 MYELOPROLIFERATIVE DISORDER (HCC): ICD-10-CM

## 2022-07-27 DIAGNOSIS — Z13.29 SCREENING FOR THYROID DISORDER: ICD-10-CM

## 2022-07-27 DIAGNOSIS — D72.829 LEUKOCYTOSIS, UNSPECIFIED TYPE: Primary | ICD-10-CM

## 2022-07-27 DIAGNOSIS — R63.5 WEIGHT GAIN: ICD-10-CM

## 2022-07-27 DIAGNOSIS — D47.1 MYELOPROLIFERATIVE DISORDER (HCC): Primary | ICD-10-CM

## 2022-07-27 DIAGNOSIS — Z13.1 SCREENING FOR DIABETES MELLITUS (DM): ICD-10-CM

## 2022-07-27 DIAGNOSIS — M25.50 ARTHRALGIA, UNSPECIFIED JOINT: ICD-10-CM

## 2022-07-27 LAB
ALBUMIN SERPL BCP-MCNC: 3.7 G/DL (ref 3.5–5)
ALP SERPL-CCNC: 83 U/L (ref 34–104)
ALT SERPL W P-5'-P-CCNC: 13 U/L (ref 7–52)
ANION GAP SERPL CALCULATED.3IONS-SCNC: 4 MMOL/L (ref 4–13)
AST SERPL W P-5'-P-CCNC: 12 U/L (ref 13–39)
BASOPHILS # BLD MANUAL: 0 THOUSAND/UL (ref 0–0.1)
BASOPHILS NFR MAR MANUAL: 0 % (ref 0–1)
BILIRUB SERPL-MCNC: 0.37 MG/DL (ref 0.2–1)
BUN SERPL-MCNC: 10 MG/DL (ref 5–25)
CALCIUM SERPL-MCNC: 8.8 MG/DL (ref 8.4–10.2)
CHLORIDE SERPL-SCNC: 107 MMOL/L (ref 96–108)
CHOLEST SERPL-MCNC: 148 MG/DL
CO2 SERPL-SCNC: 28 MMOL/L (ref 21–32)
CREAT SERPL-MCNC: 0.98 MG/DL (ref 0.6–1.3)
CRP SERPL QL: 37.4 MG/L
EOSINOPHIL # BLD MANUAL: 0.43 THOUSAND/UL (ref 0–0.4)
EOSINOPHIL NFR BLD MANUAL: 3 % (ref 0–6)
ERYTHROCYTE [DISTWIDTH] IN BLOOD BY AUTOMATED COUNT: 16.1 % (ref 11.6–15.1)
ERYTHROCYTE [SEDIMENTATION RATE] IN BLOOD: 62 MM/HOUR (ref 0–19)
EST. AVERAGE GLUCOSE BLD GHB EST-MCNC: 126 MG/DL
GFR SERPL CREATININE-BSD FRML MDRD: 74 ML/MIN/1.73SQ M
GLUCOSE P FAST SERPL-MCNC: 85 MG/DL (ref 65–99)
HBA1C MFR BLD: 6 %
HCT VFR BLD AUTO: 38.7 % (ref 34.8–46.1)
HDLC SERPL-MCNC: 35 MG/DL
HGB BLD-MCNC: 11.8 G/DL (ref 11.5–15.4)
LDLC SERPL CALC-MCNC: 102 MG/DL (ref 0–100)
LYMPHOCYTES # BLD AUTO: 32 % (ref 14–44)
LYMPHOCYTES # BLD AUTO: 4.58 THOUSAND/UL (ref 0.6–4.47)
MCH RBC QN AUTO: 24.9 PG (ref 26.8–34.3)
MCHC RBC AUTO-ENTMCNC: 30.5 G/DL (ref 31.4–37.4)
MCV RBC AUTO: 82 FL (ref 82–98)
MONOCYTES # BLD AUTO: 0.57 THOUSAND/UL (ref 0–1.22)
MONOCYTES NFR BLD: 4 % (ref 4–12)
NEUTROPHILS # BLD MANUAL: 8.74 THOUSAND/UL (ref 1.85–7.62)
NEUTS BAND NFR BLD MANUAL: 1 % (ref 0–8)
NEUTS SEG NFR BLD AUTO: 60 % (ref 43–75)
NONHDLC SERPL-MCNC: 113 MG/DL
PLATELET # BLD AUTO: 362 THOUSANDS/UL (ref 149–390)
PLATELET BLD QL SMEAR: ADEQUATE
PMV BLD AUTO: 10.4 FL (ref 8.9–12.7)
POTASSIUM SERPL-SCNC: 4.4 MMOL/L (ref 3.5–5.3)
PROT SERPL-MCNC: 7.7 G/DL (ref 6.4–8.4)
RBC # BLD AUTO: 4.74 MILLION/UL (ref 3.81–5.12)
RBC MORPH BLD: NORMAL
SODIUM SERPL-SCNC: 139 MMOL/L (ref 135–147)
TRIGL SERPL-MCNC: 54 MG/DL
TSH SERPL DL<=0.05 MIU/L-ACNC: 1.92 UIU/ML (ref 0.45–4.5)
VIT B12 SERPL-MCNC: 329 PG/ML (ref 100–900)
WBC # BLD AUTO: 14.32 THOUSAND/UL (ref 4.31–10.16)

## 2022-07-27 PROCEDURE — 81207 BCR/ABL1 GENE MINOR BP: CPT

## 2022-07-27 PROCEDURE — 80061 LIPID PANEL: CPT

## 2022-07-27 PROCEDURE — 83036 HEMOGLOBIN GLYCOSYLATED A1C: CPT

## 2022-07-27 PROCEDURE — 80053 COMPREHEN METABOLIC PANEL: CPT

## 2022-07-27 PROCEDURE — 86140 C-REACTIVE PROTEIN: CPT

## 2022-07-27 PROCEDURE — 81206 BCR/ABL1 GENE MAJOR BP: CPT

## 2022-07-27 PROCEDURE — 82607 VITAMIN B-12: CPT

## 2022-07-27 PROCEDURE — 84443 ASSAY THYROID STIM HORMONE: CPT

## 2022-07-27 PROCEDURE — 85027 COMPLETE CBC AUTOMATED: CPT

## 2022-07-27 PROCEDURE — 88184 FLOWCYTOMETRY/ TC 1 MARKER: CPT

## 2022-07-27 PROCEDURE — 85652 RBC SED RATE AUTOMATED: CPT

## 2022-07-27 PROCEDURE — 85007 BL SMEAR W/DIFF WBC COUNT: CPT

## 2022-07-27 PROCEDURE — 88185 FLOWCYTOMETRY/TC ADD-ON: CPT

## 2022-07-27 PROCEDURE — 36415 COLL VENOUS BLD VENIPUNCTURE: CPT

## 2022-07-27 PROCEDURE — 99244 OFF/OP CNSLTJ NEW/EST MOD 40: CPT

## 2022-07-27 NOTE — PROGRESS NOTES
[place] [address]  Hematology Ambulatory Consult  Jerad Jean Baptiste, 1985, [id number]  7/27/2022    Assessment/Plan:  1  Leukocytosis, unspecified type  2  Neutrophilia  3  Myeloproliferative disorder [facility])  Ms Esther Rolle is a 42-year-old female with complicated past medical history seen in consultation for elevated white blood cell count specifically neutrophils  This has been a chronic issue that has slowly been increasing over the last 3 years  It is not clear whether or not all of her blood work has been drawn in the state of inflammation/infection as most of them are from ER visits  Her but she does have a persistently elevated ANC  She is currently being worked up by GI for GERD, gastritis, or H pylori infection which could be contributing to some of her symptoms along with findings on CBC  She has persistently elevated inflammatory markers  This could be due to her alcohol intake and smoking status  She also has a questionable diagnosis of fibromyalgia but does not follow with a rheumatologist at this time  Explained in detail the workup below including repeat inflammatory markers, peripheral smear, leukemia lymphoma flow cytometry, and BCR-ABL  Explained to her that depending on the results of this workup a bone marrow biopsy may be necessary in the near future  She is very anxious about this procedure and we will discuss this again at her follow-up if necessary  She will go for the workup listed below today as well as schedule her abdominal ultrasound and mammogram at her earliest convenience  I will call her with results and if any further workup is necessary at that time  Otherwise she will repeat CBC and peripheral smear in 2 months prior to her follow-up with me in the office     - BCR/ABL, PCR; Future  - C-reactive protein; Future  - Sedimentation rate, automated;  Future  - Leukemia/Lymphoma Telephone Encounter by Zahra Jones RN, BSN at 07/13/17 11:41 AM     Author:  Zahra Jones RN, BSN Service:  (none) Author Type:  Registered Nurse     Filed:  07/13/17 11:42 AM Encounter Date:  7/13/2017 Status:  Signed     :  Zahra Jones RN, BSN (Registered Nurse)            To [facility] states that Imitrex nasal spray is on back order. They are asking if patient should be switched to Zomig nasal spray. Please advise.[AA1.1M]       Revision History        User Key Date/Time User Provider Type Action    > AA1.1 07/13/17 11:42 AM Zahra Jones RN, BSN Registered Nurse Sign    M - Manual             flow cytometry; Future  - CBC and differential; Future  - Comprehensive metabolic panel; Future  - Peripheral Smear; Future  - US abdomen limited; Future      The patient is scheduled for follow-up in approximately 2 months  Patient voiced agreement and understanding to the above  Patient knows to call the Hematology/Oncology office with any questions and concerns regarding the above  Barrier(s) to care: None  The patient is able to self care     -------------------------------------------------------------------------------------------------------    Chief Complaint   Patient presents with    Consult       Referring provider:  Sheryl Blakely  No address on file    History of present illness:  Debra Park is a 25-year-old female with complicated past medical history seen in consultation for chronically elevated white blood cell count specifically neutrophils  This has been an ongoing issue for many years  She was most recently treated for a URI/sinus infection with a Z-Jay for which she had an allergic reaction for and was seen in the emergency room for treatment  She is also undergoing workup with GI for GERD, gastritis, possible H pylori infection  For his alcohol daily about 1-2 beers per day  She is a current smoker and smokes 1-2 cigarettes per day for the last 12 years  She denies any rheumatological diagnosis  She has previously seen a rheumatologist and was thought to have fibromyalgia  She denies any recent travel  Her family history includes a maternal uncle who has sickle cell trait, her father  of lung cancer who was a smoker, and her mother had breast cancer in her early 35s status post lumpectomy  She is up to date on pap smears, not due for colonoscopy due to age, and has mamogram order but needs to scheduled  Her current symptoms include occasional night sweats, fatigue, fevers, and early satiety  Her weight has been stable    She recently got a  and is going on hikes on the weekend  She has had an extensive workup for her elevated white count including inflammatory markers with a persistently elevated sed rate and CRP  RF has been negative, ARTURO was negative previously though she did have a positive result in the past        07/11/2019:  Hemoglobin 11 2, platelets 083, WBCs 73 4, ANC 8 2  07/16/2020:  Hemoglobin 11 5, platelets 309, WBC 41 8, ANC 8, lymphocytes 3 3  12/13/2020:  Hemoglobin 10 4, platelets 026, WBC 94 7, ANC 9 6  04/28/2021:  Hemoglobin 11 1, platelets 977, WBC 19 7, ANC 11 2, lymphocytes 4 4, monocytes 1 6, basophils 0 2  05/20/2022:  Hemoglobin 12 6, platelets 193, WBC 23 1, ANC 13 7, lymphocytes 4, monocytes 1 4  06/29/2022:  Hemoglobin 12 2, platelets 647, WBC 75 61, ANC 14 65      Review of Systems   Constitutional: Positive for appetite change, diaphoresis and fatigue  Negative for activity change, fever and unexpected weight change  HENT: Negative for trouble swallowing and voice change  Eyes: Negative for photophobia and visual disturbance  Respiratory: Negative for cough, chest tightness, shortness of breath and wheezing  Cardiovascular: Positive for leg swelling (on lasix)  Negative for chest pain and palpitations  Gastrointestinal: Positive for nausea  Negative for abdominal distention, anal bleeding, blood in stool, constipation, diarrhea and vomiting  Endocrine: Negative for cold intolerance and heat intolerance  Genitourinary: Negative for dysuria, menstrual problem and urgency  Musculoskeletal: Positive for arthralgias  Negative for back pain, gait problem, joint swelling and myalgias  Skin: Negative for pallor and rash  Neurological: Negative for dizziness, tremors, weakness, light-headedness and headaches  Hematological: Negative for adenopathy  Does not bruise/bleed easily  Psychiatric/Behavioral: Negative for confusion and sleep disturbance         Patient Active Problem List   Diagnosis    Abdominal pain, epigastric    Bacterial vaginosis    Bilateral carpal tunnel syndrome    Bleeding after intercourse    Breast discharge    Chlamydial infection    GERD without esophagitis    Migraine with aura and without status migrainosus, not intractable    MRI of brain abnormal    Positive ARTURO (antinuclear antibody)    Tic disorder    Vaginal bleeding between periods    Vaginal discharge    Vision disturbance    Obesity due to excess calories, unspecified obesity severity    Abnormal blood chemistry level    Leukocytosis       Past Medical History:   Diagnosis Date    Anxiety     Depression     GERD (gastroesophageal reflux disease)     Hypertension     No known health problems        Past Surgical History:   Procedure Laterality Date    NO PAST SURGERIES         Family History   Problem Relation Age of Onset    Breast cancer Mother     Colon cancer Maternal Uncle     Ovarian cancer Neg Hx     Uterine cancer Neg Hx     Cervical cancer Neg Hx        Social History     Socioeconomic History    Marital status: Single     Spouse name: None    Number of children: None    Years of education: None    Highest education level: None   Occupational History    None   Tobacco Use    Smoking status: Current Some Day Smoker    Smokeless tobacco: Never Used   Vaping Use    Vaping Use: Never used   Substance and Sexual Activity    Alcohol use: Yes    Drug use: Never    Sexual activity: Not Currently     Partners: Male   Other Topics Concern    None   Social History Narrative    None     Social Determinants of Health     Financial Resource Strain: Not on file   Food Insecurity: Not on file   Transportation Needs: Not on file   Physical Activity: Insufficiently Active    Days of Exercise per Week: 2 days    Minutes of Exercise per Session: 60 min   Stress: Stress Concern Present    Feeling of Stress :  To some extent   Social Connections: Not on file   Intimate Partner Violence: Not on file   Housing Stability: Not on file         Current Outpatient Medications:     DULoxetine (CYMBALTA) 30 mg delayed release capsule, Take 30 mg by mouth daily, Disp: , Rfl:     famotidine (PEPCID) 20 mg tablet, Take 1 tablet (20 mg total) by mouth 2 (two) times a day, Disp: 30 tablet, Rfl: 0    fluticasone (FLONASE) 50 mcg/act nasal spray, 2 sprays into each nostril daily, Disp: , Rfl:     furosemide (LASIX) 40 mg tablet, Take 1 tablet by mouth daily, Disp: , Rfl:     hydrocortisone 2 5 % lotion, Apply topically 2 (two) times a day, Disp: 59 mL, Rfl: 0    hydrOXYzine HCL (ATARAX) 25 mg tablet, Take 25 mg by mouth every 6 (six) hours as needed, Disp: , Rfl:     methocarbamol (ROBAXIN) 750 mg tablet, Take 750 mg by mouth 4 (four) times a day as needed, Disp: , Rfl:     pantoprazole (PROTONIX) 40 mg tablet, Take 1 tablet (40 mg total) by mouth 2 (two) times a day, Disp: 60 tablet, Rfl: 1    sucralfate (CARAFATE) 1 g tablet, Take 1 tablet (1 g total) by mouth 4 (four) times a day, Disp: 60 tablet, Rfl: 0    triamcinolone (KENALOG) 0 1 % ointment, Apply topically 2 (two) times a day, Disp: 30 g, Rfl: 0    Allergies   Allergen Reactions    Azithromycin GI Intolerance       Objective:  /80 (BP Location: Left arm, Patient Position: Sitting, Cuff Size: Large)   Pulse 71   Temp 98 1 °F (36 7 °C) (Temporal)   Resp 16   Ht 5' 8" (1 727 m)   Wt 123 kg (271 lb)   SpO2 99%   BMI 41 21 kg/m²   Physical Exam  Constitutional:       General: She is not in acute distress  Appearance: Normal appearance  She is not ill-appearing  HENT:      Head: Atraumatic  Eyes:      Extraocular Movements: Extraocular movements intact  Conjunctiva/sclera: Conjunctivae normal    Cardiovascular:      Rate and Rhythm: Normal rate and regular rhythm  Pulses: Normal pulses  Heart sounds: Normal heart sounds  No murmur heard  Pulmonary:      Effort: Pulmonary effort is normal  No respiratory distress  Breath sounds: Normal breath sounds  Abdominal:      General: Bowel sounds are normal  There is no distension  Palpations: Abdomen is soft  Tenderness: There is no abdominal tenderness  Musculoskeletal:      Cervical back: Normal range of motion  No tenderness  Right lower leg: No edema  Left lower leg: No edema  Lymphadenopathy:      Cervical: No cervical adenopathy  Skin:     General: Skin is warm and dry  Capillary Refill: Capillary refill takes less than 2 seconds  Findings: No bruising or lesion  Neurological:      General: No focal deficit present  Mental Status: She is alert and oriented to person, place, and time  Mental status is at baseline  Motor: No weakness  Gait: Gait normal    Psychiatric:         Mood and Affect: Mood normal          Behavior: Behavior normal          Thought Content: Thought content normal          Judgment: Judgment normal           Result Review  Labs:   Lab Results   Component Value Date    WBC 14 32 (H) 07/27/2022    HGB 11 8 07/27/2022    HCT 38 7 07/27/2022    MCV 82 07/27/2022     07/27/2022     Lab Results   Component Value Date    SODIUM 137 06/29/2022    K 5 5 (H) 06/29/2022     06/29/2022    CO2 25 06/29/2022    AGAP 7 06/29/2022    BUN 11 06/29/2022    CREATININE 0 75 06/29/2022    GLUC 89 06/29/2022    CALCIUM 8 8 06/29/2022    AST 37 06/29/2022    ALT 21 06/29/2022    ALKPHOS 95 06/29/2022    TP 8 3 (H) 06/29/2022    TBILI 0 62 06/29/2022    EGFR 103 06/29/2022       Imaging:  No relevant imaging to review  Please note: This report has been generated by a voice recognition software system  Therefore there may be syntax, spelling, and/or grammatical errors  Please call if you have any questions

## 2022-07-29 LAB — SCAN RESULT: NORMAL

## 2022-08-01 LAB — SCAN RESULT: NORMAL

## 2022-08-08 RX ORDER — SODIUM CHLORIDE, SODIUM LACTATE, POTASSIUM CHLORIDE, CALCIUM CHLORIDE 600; 310; 30; 20 MG/100ML; MG/100ML; MG/100ML; MG/100ML
125 INJECTION, SOLUTION INTRAVENOUS CONTINUOUS
Status: CANCELLED | OUTPATIENT
Start: 2022-08-08

## 2022-08-10 ENCOUNTER — TELEPHONE (OUTPATIENT)
Dept: GASTROENTEROLOGY | Facility: CLINIC | Age: 37
End: 2022-08-10

## 2022-08-10 NOTE — TELEPHONE ENCOUNTER
Patients GI provider:  Dr Kayli Morse    Number to return call: [phone]    Reason for call: Pt calling to schedule procedure      Scheduled procedure/appointment date if applicable: N/A

## 2022-09-28 ENCOUNTER — TELEPHONE (OUTPATIENT)
Dept: HEMATOLOGY ONCOLOGY | Facility: CLINIC | Age: 37
End: 2022-09-28

## 2022-09-28 NOTE — TELEPHONE ENCOUNTER
Patient stated that she will have her labs completed on Saturday 10/1/2022  Verified lab hours at the [facility] on Saturdays  are 7am-12noon  Patient has been rescheduled for follow up on Friday 10/7/2022 @ 8:30 AM     Also advised patient to schedule US left upper quadrant  Patient verbalized understanding and agreement  Call transferred to central scheduling       ----- Message from [facility] sent at 9/28/2022  2:41 PM EDT -----  Nomi Ordoñez this patient did not have labs for tomorrow can we get her rescheduled  Thanks!

## 2022-10-05 ENCOUNTER — DOCUMENTATION (OUTPATIENT)
Dept: HEMATOLOGY ONCOLOGY | Facility: CLINIC | Age: 37
End: 2022-10-05

## 2022-10-05 ENCOUNTER — HOSPITAL ENCOUNTER (OUTPATIENT)
Dept: RADIOLOGY | Facility: MEDICAL CENTER | Age: 37
Discharge: HOME | End: 2022-10-05
Payer: COMMERCIAL

## 2022-10-05 ENCOUNTER — TELEPHONE (OUTPATIENT)
Dept: HEMATOLOGY ONCOLOGY | Facility: CLINIC | Age: 37
End: 2022-10-05

## 2022-10-05 DIAGNOSIS — D72.9 NEUTROPHILIA: ICD-10-CM

## 2022-10-05 DIAGNOSIS — D72.829 LEUKOCYTOSIS, UNSPECIFIED TYPE: ICD-10-CM

## 2022-10-05 PROCEDURE — 76705 ECHO EXAM OF ABDOMEN: CPT

## 2022-10-05 NOTE — TELEPHONE ENCOUNTER
Called patient to provide additional reminder to have her labs completed today  Prior to her f/u appointment on 10/7/2022  Patient verbalized understanding and agreement

## 2022-10-05 NOTE — PROGRESS NOTES
Read requested for : [address] left upper quadrant (Order: [id number]) - 10/5/2022    Spoke with Lonne Angers

## 2022-10-06 ENCOUNTER — TELEPHONE (OUTPATIENT)
Dept: HEMATOLOGY ONCOLOGY | Facility: CLINIC | Age: 37
End: 2022-10-06

## 2022-10-06 NOTE — TELEPHONE ENCOUNTER
Called patient to advise that her follow up appointment with Lizzie Eisenberg on 10/7/2022 must be rescheduled due to missing labs  Explained that one of her lab tests has a longer turnaround time and will not result in time for her visit tomorrow  Appointment has been rescheduled to Friday 10/14/2022  Patient stated that she will get her labs completed tomorrow 10/7  Advised patient that labs should be completed no later than Saturday 10/8 otherwise we will not get the results  Patient verbalized understanding and agreement

## 2022-10-08 ENCOUNTER — APPOINTMENT (OUTPATIENT)
Dept: LAB | Facility: MEDICAL CENTER | Age: 37
End: 2022-10-08
Payer: COMMERCIAL

## 2022-10-08 DIAGNOSIS — D47.1 MYELOPROLIFERATIVE DISORDER (HCC): ICD-10-CM

## 2022-10-08 DIAGNOSIS — D72.9 NEUTROPHILIA: ICD-10-CM

## 2022-10-08 DIAGNOSIS — D72.829 LEUKOCYTOSIS, UNSPECIFIED TYPE: ICD-10-CM

## 2022-10-08 LAB
ERYTHROCYTE [DISTWIDTH] IN BLOOD BY AUTOMATED COUNT: 16.8 % (ref 11.6–15.1)
HCT VFR BLD AUTO: 40.2 % (ref 34.8–46.1)
HGB BLD-MCNC: 12.2 G/DL (ref 11.5–15.4)
MCH RBC QN AUTO: 25.2 PG (ref 26.8–34.3)
MCHC RBC AUTO-ENTMCNC: 30.3 G/DL (ref 31.4–37.4)
MCV RBC AUTO: 83 FL (ref 82–98)
NRBC BLD AUTO-RTO: 0 /100 WBCS
PLATELET # BLD AUTO: 336 THOUSANDS/UL (ref 149–390)
PMV BLD AUTO: 11.4 FL (ref 8.9–12.7)
RBC # BLD AUTO: 4.85 MILLION/UL (ref 3.81–5.12)
WBC # BLD AUTO: 14.46 THOUSAND/UL (ref 4.31–10.16)

## 2022-10-08 PROCEDURE — 85027 COMPLETE CBC AUTOMATED: CPT

## 2022-10-08 PROCEDURE — 85007 BL SMEAR W/DIFF WBC COUNT: CPT

## 2022-10-08 PROCEDURE — 36415 COLL VENOUS BLD VENIPUNCTURE: CPT

## 2022-10-10 LAB
ANISOCYTOSIS BLD QL SMEAR: PRESENT
IMM EOSINOPHIL NFR BLD MANUAL: 2 % (ref 0–6)
LYMPHOCYTES NFR BLD: 30 % (ref 14–44)
MONOCYTES NFR BLD AUTO: 5 % (ref 4–12)
NEUTS BAND NFR BLD MANUAL: 0 THOUSAND/UL
NEUTS SEG NFR BLD AUTO: 61 % (ref 45–77)
PATHOLOGIST INTERPRETATION: NORMAL
PLATELET BLD QL SMEAR: ADEQUATE
TOTAL CELLS COUNTED SPEC: 100
VARIANT LYMPHS BLD QL SMEAR: 2 % (ref 0–0)

## 2022-10-14 ENCOUNTER — TELEPHONE (OUTPATIENT)
Dept: GYNECOLOGIC ONCOLOGY | Facility: CLINIC | Age: 37
End: 2022-10-14

## 2022-10-14 NOTE — TELEPHONE ENCOUNTER
Called patient and left message to reschedule  No show appointment on 10/14/2022 with Rossana Siegel

## 2022-11-01 ENCOUNTER — PREP FOR PROCEDURE (OUTPATIENT)
Dept: GASTROENTEROLOGY | Facility: CLINIC | Age: 37
End: 2022-11-01

## 2022-11-01 ENCOUNTER — TELEPHONE (OUTPATIENT)
Dept: GASTROENTEROLOGY | Facility: CLINIC | Age: 37
End: 2022-11-01

## 2022-11-01 ENCOUNTER — TELEPHONE (OUTPATIENT)
Dept: HEMATOLOGY ONCOLOGY | Facility: CLINIC | Age: 37
End: 2022-11-01

## 2022-11-01 DIAGNOSIS — R11.0 NAUSEA: ICD-10-CM

## 2022-11-01 DIAGNOSIS — K21.00 GASTROESOPHAGEAL REFLUX DISEASE WITH ESOPHAGITIS, UNSPECIFIED WHETHER HEMORRHAGE: Primary | ICD-10-CM

## 2022-11-01 DIAGNOSIS — R10.13 EPIGASTRIC PAIN: ICD-10-CM

## 2022-11-01 NOTE — TELEPHONE ENCOUNTER
Scheduling Appointment SEND TO [facility] [place]    Who Is Calling to Schedule  Patient   Doctor Suzette Right   Location [facility]   Date and Time 11/09 8AM   Reason for scheduling appointment Missed last appointment   Patient verbalized understanding   Yes

## 2022-11-03 ENCOUNTER — OFFICE VISIT (OUTPATIENT)
Dept: OBGYN CLINIC | Facility: MEDICAL CENTER | Age: 37
End: 2022-11-03

## 2022-11-03 VITALS
DIASTOLIC BLOOD PRESSURE: 80 MMHG | WEIGHT: 275 LBS | SYSTOLIC BLOOD PRESSURE: 126 MMHG | HEIGHT: 68 IN | BODY MASS INDEX: 41.68 KG/M2

## 2022-11-03 DIAGNOSIS — R23.4 BREAST SKIN CHANGES: ICD-10-CM

## 2022-11-03 DIAGNOSIS — D22.9 NUMEROUS SKIN MOLES: ICD-10-CM

## 2022-11-03 DIAGNOSIS — Z11.3 SCREENING FOR STDS (SEXUALLY TRANSMITTED DISEASES): ICD-10-CM

## 2022-11-03 DIAGNOSIS — N92.0 FREQUENT MENSTRUATION: Primary | ICD-10-CM

## 2022-11-03 PROBLEM — N64.52 BREAST DISCHARGE: Status: RESOLVED | Noted: 2017-02-23 | Resolved: 2022-11-03

## 2022-11-03 LAB — SL AMB POCT URINE HCG: NORMAL

## 2022-11-03 NOTE — PROGRESS NOTES
Assessment/Plan:    No problem-specific Assessment & Plan notes found for this encounter  Diagnoses and all orders for this visit:    Frequent menstruation  -     US pelvis complete w transvaginal; Future  -     POCT urine HCG    Breast skin changes  -     Mammo diagnostic left w 3d & cad; Future    Numerous skin moles  -     Ambulatory referral to Dermatology; Future    Screening for STDs (sexually transmitted diseases)  -     Chlamydia/GC amplified DNA by PCR      we discussed causes of irregular menstrual cycles such as stress, weight gain, weight loss, thyroid disorders, normal hormonal fluctuations  Patient is comfortable watching and waiting for menstrual cycles to re regulate  Pelvic ultrasound was ordered  Pregnancy test in office was negative  And chlamydia sent  Will follow-up with test results, if bleeding continues we will endometrial biopsy      Subjective:      Patient ID: Cl Abrams is a 29 y o  female  67-year-old sexually active female presents today with concerns for having a menstrual cycle 3 times in the last month and a half  She reports having her menses on 09/06/2022, 09/30/2022, 10/06/2022  She reports these 3 cycles were heavier than normal with some clotting  However she does report increased amount of stress and some medical issues that she is currently in the process of having evaluated with hem onc for leukocytosis  Recent thyroid level was normal   Denies any pelvic pain, unusual vaginal discharge or odor at this time  This is abnormal for her  Today there is no bleeding  She is entered into a new relationship and using condoms for [facility]  She also mentions today that her left breast skin occasionally looks like an orange peel, she is not sure if this is just dry skin or if there is a disease process happening    Patient denies any pain or lumps to that breast  Patient is a smoker      The following portions of the patient's history were reviewed and updated as appropriate: allergies, current medications, past family history, past medical history, past social history, past surgical history and problem list     Review of Systems   Constitutional: Negative for chills, fatigue and fever  Eyes: Negative for visual disturbance  Respiratory: Negative for cough and shortness of breath  Cardiovascular: Negative for chest pain  Gastrointestinal: Negative for abdominal pain  Genitourinary: Positive for menstrual problem  Negative for vaginal bleeding and vaginal discharge  Objective:      /80 (BP Location: Left arm, Patient Position: Sitting, Cuff Size: Standard)   Ht 5' 8" (1 727 m)   Wt 125 kg (275 lb)   LMP  (Exact Date)   BMI 41 81 kg/m²          Physical Exam  Vitals and nursing note reviewed  Constitutional:       Appearance: Normal appearance  She is normal weight  HENT:      Head: Normocephalic and atraumatic  Eyes:      Conjunctiva/sclera: Conjunctivae normal    Cardiovascular:      Rate and Rhythm: Normal rate  Pulmonary:      Effort: Pulmonary effort is normal    Chest:       Genitourinary:     General: Normal vulva  Exam position: Lithotomy position  Taco stage (genital): 5  Labia:         Right: No rash, tenderness, lesion or injury  Left: No rash, tenderness, lesion or injury  Vagina: Normal       Cervix: Normal       Uterus: Normal        Adnexa: Right adnexa normal and left adnexa normal    Musculoskeletal:         General: Normal range of motion  Cervical back: Normal range of motion  Lymphadenopathy:      Lower Body: No right inguinal adenopathy  No left inguinal adenopathy  Skin:     General: Skin is warm and dry  Neurological:      Mental Status: She is alert  Psychiatric:         Mood and Affect: Mood normal          Behavior: Behavior normal          Thought Content:  Thought content normal          Judgment: Judgment normal

## 2022-11-03 NOTE — PATIENT INSTRUCTIONS
Menstruation   WHAT YOU NEED TO KNOW:   What do I need to know about menstruation? Menstruation is also called your monthly period  Menstruation usually starts at about 15years old  Some girls may have their first period as early as 5years of age or as late as 12 years or older  Menopause is the time when menstruations stops  This usually happens around 48years of age  Menstruation is part of a cycle that helps prepare your body for pregnancy  During your menstrual cycle each month, your hormone levels increase  The lining of your uterus becomes thicker, and ovulation happens  Ovulation is when your ovaries release an egg  If the egg does not get fertilized, the lining of your uterus sheds and menstruation happens  Menstruation usually happens every 21 to 28 days  What happens each month? Each period may last for 2 to 7 days and can be light, moderate, or heavy  The total amount of blood loss may be 1 to 4 tablespoons (20 to 60 milliliters) for the whole menstrual period  This amount may be different among women and it may be different for you from one period to another  What symptoms may I have before my period starts? These symptoms are part of premenstrual syndrome (PMS) and usually go away when your period starts  Ask your healthcare provider for more information about any of the following:  Mood changes such as feeling irritated, sad, or emotional    Breast swelling or soreness    Feeling bloated    Tiredness    Headache    Problems with sleep    Dizziness    Nausea    How can I care for myself during menstruation? NSAIDs , such as ibuprofen, help decrease PMS symptoms  This medicine is available with or without a doctor's order  Take them as directed  NSAIDs can cause stomach bleeding or kidney problems in certain people  If you take blood thinner medicine, always ask your healthcare provider if NSAIDs are safe for you  Always read the medicine label and follow directions      Use tampons or sanitary pads  Read the instructions carefully or ask how to use tampons or sanitary pads  Always wash your hands before you put in a new tampon to prevent infection  Wash your hands after you change pads or tampons  Change your pad or tampon about every 3 to 4 hours to keep the blood from soaking through your clothes  Change your tampon often to help prevent toxic shock syndrome (TSS)  This rare condition is caused by a bacteria and may be related to leaving a tampon in for a long time  Alternate tampons and pads during the day  Use sanitary pads at night  This may help prevent TSS  Wrap toilet paper around the pad or tampon and throw it in the trash  Do not flush the pad or tampon down the toilet  It can block up  lines  What is toxic shock syndrome (TSS)? TSS is a rare condition caused by a bacteria and may be related to leaving a tampon in for a long time  Alternate tampons and pads during the day  Use sanitary pads at night  This may help prevent TSS  When should I seek immediate care? You have severe abdominal cramps  You have any of the following during or after you use tampons:    Fever and chills    Diarrhea    Vomiting    Lightheadedness or confusion    A rash    Muscle aches    When should I contact my healthcare provider? You change pads or tampons every 1 hour or more often  You skip periods or they are irregular  Your periods last longer than 7 days  You periods occur more often than every 21 days or less often than every 45 days  You have questions or concerns about your condition or care  CARE AGREEMENT:   You have the right to help plan your care  Learn about your health condition and how it may be treated  Discuss treatment options with your healthcare providers to decide what care you want to receive  You always have the right to refuse treatment  The above information is an  only   It is not intended as medical advice for individual conditions or treatments  Talk to your doctor, nurse or pharmacist before following any medical regimen to see if it is safe and effective for you  © Copyright [employer] 2022 Information is for End User's use only and may not be sold, redistributed or otherwise used for commercial purposes  All illustrations and images included in CareNotes® are the copyrighted property of [employer]  or Pete Shetty      Breast Self Exam for Women   AMBULATORY CARE:   A breast self-exam (BSE)  is a way to check your breasts for lumps and other changes  Regular BSEs can help you know how your breasts normally look and feel  Most breast lumps or changes are not cancer, but you should always have them checked by a healthcare provider  Why you should do a BSE:  Breast cancer is the most common type of cancer in women  Even if you have mammograms, you may still want to do a BSE regularly  If you know how your breasts normally feel and look, it may help you know when to contact your healthcare provider  Mammograms can miss some cancers  You may find a lump during a BSE that did not show up on a mammogram   When you should do a BSE:  If you have periods, you may want to do your BSE 1 week after your period ends  This is the time when your breasts may be the least swollen, lumpy, or tender  You can do regular BSEs even if you are breastfeeding or have breast implants  Call your doctor if:   You find any lumps or changes in your breasts  You have breast pain or fluid coming from your nipples  You have questions or concerns about your condition or care  How to do a BSE:       Look at your breasts in a mirror  Look at the size and shape of each breast and nipple  Check for swelling, lumps, dimpling, scaly skin, or other skin changes  Look for nipple changes, such as a nipple that is painful or beginning to pull inward  Gently squeeze both nipples and check to see if fluid (that is not breast milk) comes out of them  If you find any of these or other breast changes, contact your healthcare provider  Check your breasts while you sit or  the following 3 positions:    Karthikeyan Rota your arms down at your sides  Raise your hands and join them behind your head  Put firm pressure with your hands on your hips  Bend slightly forward while you look at your breasts in the mirror  Lie down and feel your breasts  When you lie down, your breast tissue spreads out evenly over your chest  This makes it easier for you to feel for lumps and anything that may not be normal for your breasts  Do a BSE on one breast at a time  Place a small pillow or towel under your left shoulder  Put your left arm behind your head  Use the 3 middle fingers of your right hand  Use your fingertip pads, on the top of your fingers  Your fingertip pad is the most sensitive part of your finger  Use small circles to feel your breast tissue  Use your fingertip pads to make dime-sized, overlapping circles on your breast and armpits  Use light, medium, and firm pressure  First, press lightly  Second, press with medium pressure to feel a little deeper into the breast  Last, use firm pressure to feel deep within your breast     Examine your entire breast area  Examine the breast area from above the breast to below the breast where you feel only ribs  Make small circles with your fingertips, starting in the middle of your armpit  Make circles going up and down the breast area  Continue toward your breast and all the way across it  Examine the area from your armpit all the way over to the middle of your chest (breastbone)  Stop at the middle of your chest     Move the pillow or towel to your right shoulder, and put your right arm behind your head    Use the 3 fingertip pads of your left hand, and repeat the above steps to do a BSE on your right breast     What else you can do to check for breast problems or cancer:  Talk to your healthcare provider about mammograms  A mammogram is an x-ray of your breasts to screen for breast cancer or other problems  Your provider can tell you the benefits and risks of mammograms  The first mammogram is usually at age 39 or 48  Your provider may recommend you start at 36 or younger if your risk for breast cancer is high  Mammograms usually continue every 1 to 2 years until age 76  Follow up with your doctor as directed:  Write down your questions so you remember to ask them during your visits  © Copyright [employer] 2022 Information is for End User's use only and may not be sold, redistributed or otherwise used for commercial purposes  All illustrations and images included in CareNotes® are the copyrighted property of [employer]  or Pete Foss   The above information is an  only  It is not intended as medical advice for individual conditions or treatments  Talk to your doctor, nurse or pharmacist before following any medical regimen to see if it is safe and effective for you  Perineal Hygiene      Your vaginal naturally takes care of its self, it is a self washing system, the less you mess the healthier it will be     No soaps or feminine wash to the vulva, these products can cause dermitis, bacterial infections and other vulvar problems  Use only water to cleanse, or water with [employer] or [employer] if necessary  No scented lotions or products are advised in or near your vulva  Use only coconut oil for moisture if needed  No douching this may cause imbalance in your vaginal PH and further issues  If you wear panty liners, you may apply a thin coating of Vaseline, A&D ointment or coconut oil to the vulvar tissues as a skin barrier     Cotton underware, loose fitting clothing  Only perfume-free, dye-free laundry detergent, use a second rinse cycle   Avoid fabric softeners/dryer sheets  Partner should avoid the same products as well         Over the counter probiotic to restore vaginal meet may be helpful as well, take daily  You may also look into Boric Acid vaginal suppositories to restore vaginal PH balance for up to 2 weeks as directed on the box  You may not use these if you are pregnant      For vaginal dryness: You may use:     Coconut oil (organic, pure, unscented) as needed for moisture or lubrication  ( Do not use if allergic)       Replens moisture restore external comfort gel daily ( use as directed on the box)        Replens long lasting vaginal moisturizer  ( use as directed on the box)         For Vaginal Lubrication:          You may use:     Coconut oil (organic, pure, unscented) as a lubricant or another scent-free lubricant (Astroglide, Uberlube) if needed  Do not use coconut oil or silicone if using a condom as this may break down the integrity of the condom and cause an unplanned pregnancy              Do not use coconut oil if allergic               Replens silky smooth lubricant, premium silicone based lubricant for intercourse  ( use as directed, a small amount will provide an enhanced natural feeling)     Any premium over the counter vaginal lubricant water or silicone based  Silicone based will have more staying power  Prophylactic NSAID therapy for Painful or Heavy menses     Ibuprofen or Naproxen (chose 1 or the other, do not take both), Dose as noted on the box  Typically Ibuprofen dose is 600 mg, (3 tablets) every 6-8 hours  Typically Naproxen dose is 500 mg every 12 hours  Start taking medication 2 days prior to onset of menses and continue taking through the first 3 days of menses   Make sure you take consistently this is important  You need to take with food to decrease any gastrointestinal upset effects    This is proven therapy to reduce you flow and cramping by 50 %    Life style changes that have a positive effect on painful and heavy periods are as follows   Daily physical exercise    Increase fiber, fresh fruits and vegetables in your diet    Increase daily water intake    Heating pads(do not apply directly to skin, apply over clothing or towel)   Warm Baths   Relaxation techniques, meditation, massage, yoga and mindfulness     These are all suggestion for improving your sense of frustrations with your menstrual cycle and improving your overall wellness and lifestyle

## 2022-11-03 NOTE — PROGRESS NOTES
29year old female here with concern on her menses she reports that she has been getting her menses twice a month   Patient reports her menses dates as follow   9/6/2022  9/30/2022  10/6/2022    Patient also want to report that she is in a new  relationship now and doing well and she is happy   Patient  report he treats her very well   Patient reports that she is using condoms for birth control

## 2022-11-04 LAB
C TRACH DNA SPEC QL NAA+PROBE: NEGATIVE
N GONORRHOEA DNA SPEC QL NAA+PROBE: NEGATIVE

## 2022-11-09 ENCOUNTER — OFFICE VISIT (OUTPATIENT)
Dept: HEMATOLOGY ONCOLOGY | Facility: CLINIC | Age: 37
End: 2022-11-09

## 2022-11-09 ENCOUNTER — APPOINTMENT (OUTPATIENT)
Dept: LAB | Facility: CLINIC | Age: 37
End: 2022-11-09

## 2022-11-09 VITALS
WEIGHT: 276 LBS | TEMPERATURE: 98 F | DIASTOLIC BLOOD PRESSURE: 82 MMHG | RESPIRATION RATE: 14 BRPM | OXYGEN SATURATION: 98 % | BODY MASS INDEX: 41.83 KG/M2 | HEIGHT: 68 IN | SYSTOLIC BLOOD PRESSURE: 140 MMHG | HEART RATE: 74 BPM

## 2022-11-09 DIAGNOSIS — D72.9 NEUTROPHILIA: ICD-10-CM

## 2022-11-09 DIAGNOSIS — D72.829 LEUKOCYTOSIS, UNSPECIFIED TYPE: ICD-10-CM

## 2022-11-09 DIAGNOSIS — R71.8 ANISOCYTOSIS: ICD-10-CM

## 2022-11-09 DIAGNOSIS — G62.9 NEUROPATHY: ICD-10-CM

## 2022-11-09 DIAGNOSIS — D72.829 LEUKOCYTOSIS, UNSPECIFIED TYPE: Primary | ICD-10-CM

## 2022-11-09 LAB
ALBUMIN SERPL BCP-MCNC: 3.8 G/DL (ref 3.5–5)
ALP SERPL-CCNC: 83 U/L (ref 34–104)
ALT SERPL W P-5'-P-CCNC: 16 U/L (ref 7–52)
ANION GAP SERPL CALCULATED.3IONS-SCNC: 5 MMOL/L (ref 4–13)
AST SERPL W P-5'-P-CCNC: 13 U/L (ref 13–39)
BILIRUB SERPL-MCNC: 0.48 MG/DL (ref 0.2–1)
BUN SERPL-MCNC: 12 MG/DL (ref 5–25)
CALCIUM SERPL-MCNC: 8.7 MG/DL (ref 8.4–10.2)
CHLORIDE SERPL-SCNC: 107 MMOL/L (ref 96–108)
CO2 SERPL-SCNC: 24 MMOL/L (ref 21–32)
CREAT SERPL-MCNC: 0.88 MG/DL (ref 0.6–1.3)
CRP SERPL QL: 46.1 MG/L
ERYTHROCYTE [SEDIMENTATION RATE] IN BLOOD: 83 MM/HOUR (ref 0–19)
FERRITIN SERPL-MCNC: 25 NG/ML (ref 8–388)
FOLATE SERPL-MCNC: 7.5 NG/ML (ref 3.1–17.5)
GFR SERPL CREATININE-BSD FRML MDRD: 85 ML/MIN/1.73SQ M
GLUCOSE P FAST SERPL-MCNC: 100 MG/DL (ref 65–99)
IRON SATN MFR SERPL: 7 % (ref 15–50)
IRON SERPL-MCNC: 26 UG/DL (ref 50–170)
POTASSIUM SERPL-SCNC: 4.3 MMOL/L (ref 3.5–5.3)
PROT SERPL-MCNC: 7.9 G/DL (ref 6.4–8.4)
SODIUM SERPL-SCNC: 136 MMOL/L (ref 135–147)
TIBC SERPL-MCNC: 373 UG/DL (ref 250–450)
VIT B12 SERPL-MCNC: 306 PG/ML (ref 100–900)

## 2022-11-09 NOTE — LETTER
November 9, 2022     Patient: Garrick Retana  YOB: 1985  Date of Visit: 11/9/2022      To Whom it May Concern:    Garrick Retana is under my professional care  Migdalia Buchanan was seen in my office on 11/9/2022  Migdalia Buchanan may return to work on 11/11/22 with limitations due to her symptoms  If you have any questions or concerns, please don't hesitate to call           Sincerely,          SEBASTIEN Aleman        CC: No Recipients

## 2022-11-09 NOTE — PROGRESS NOTES
[facility] [address] [address]  Hematology Ambulatory Follow-Up  Levi Odell, 1985, [id number]  11/9/2022    Assessment/Plan:  1  Leukocytosis, unspecified type  2  Neutrophilia  3  Anisocytosis  Ms Zeus Gonzalez is a 52-year-old female seen in follow-up for leukocytosis, neutrophilia  This is been a chronic issue and increasing over the last 3+ years  She does have chronically elevated inflammatory markers  Today her workup has been negative for leukemia/lymphoma flow cytometry, bcr/ABL, splenomegaly  We discussed that with her new symptoms and worsening fatigue I would like to assess for iron, B12, folate deficiency, serum protein electrophoresis for the neuropathy, and repeat inflammatory markers  I will call her with these results and if any further workup for interventions are necessary at that time  I suggested she continue to follow with her other specialists and her PCP for her other symptoms     - Iron Panel (Includes Ferritin, Iron Sat%, Iron, and TIBC); Future  - Vitamin B12; Future  - Methylmalonic acid, serum; Future  - Folate; Future  - Protein electrophoresis, serum; Future  - Comprehensive metabolic panel; Future  - C-reactive protein; Future  - Sedimentation rate, automated; Future    The patient is scheduled for follow-up in approximately 2 months (virtual)  Patient voiced agreement and understanding to the above  Patient knows to call the Hematology/Oncology office with any questions and concerns regarding the above  Barrier(s) to care: None  The patient is able to self care   ------------------------------------------------------------------------------------------------------    Chief Complaint   Patient presents with   • Follow-up       History of present illness:   Levi Odell is a 52-year-old female seen in follow-up for leukocytosis    She was originally seen in consultation in July of 2022 for chronically elevated white blood cell count specifically neutrophils  She does have chronic inflammation including recent sinus infections, workup with Gastroenterology for GERD, gastritis, or possible H pylori infection  She is a current smoker and smokes about 1-2 cigarettes per day    Initial Work Up:  07/27/2022:    Leukemia lymphoma flow cytology, bcr ABL, B12, TSH:  Within normal limits  Sed rate and CRP: Elevated  Past slide review:  Elevated white blood cell with absolute granulocytosis  Neutrophils show no morphologic abnormality  No blasts identified  US LUQ: no splenomegaly     07/11/2019:  Hemoglobin 11 2, platelets 941, WBCs 47 2, ANC 8 2  07/16/2020:  Hemoglobin 11 5, platelets 439, WBC 23 8, ANC 8, lymphocytes 3 3  12/13/2020:  Hemoglobin 10 4, platelets 016, WBC 31 1, ANC 9 6  04/28/2021:  Hemoglobin 11 1, platelets 779, WBC 48 7, ANC 11 2, lymphocytes 4 4, monocytes 1 6, basophils 0 2  05/20/2022:  Hemoglobin 12 6, platelets 476, WBC 00 9, ANC 13 7, lymphocytes 4, monocytes 1 4  06/29/2022:  Hemoglobin 12 2, platelets 187, WBC 32 54, ANC 14 65  10/08/2022:  Hemoglobin 12 2, platelets 006, WBC 53 04, atypical lymphocytes 2%   Past slide review:  Leukocytosis with neutrophilia  No over pathologic abnormalities noted on peripheral smear    Interval history:  She continues with significant fatigue  She has new neuropathy in her legs and arms with significant pain in her legs  She has still not undergone EGD and continues with vomiting  Review of Systems   Constitutional: Positive for appetite change, diaphoresis and fatigue  Negative for activity change, fever and unexpected weight change  HENT: Negative for trouble swallowing and voice change  Eyes: Negative for photophobia and visual disturbance  Respiratory: Negative for cough, chest tightness, shortness of breath and wheezing  Cardiovascular: Positive for leg swelling (on lasix)  Negative for chest pain and palpitations  Gastrointestinal: Positive for nausea  Negative for abdominal distention, anal bleeding, blood in stool, constipation, diarrhea and vomiting  Endocrine: Negative for cold intolerance and heat intolerance  Genitourinary: Negative for dysuria, menstrual problem and urgency  Musculoskeletal: Positive for arthralgias  Negative for back pain, gait problem, joint swelling and myalgias  Skin: Negative for pallor and rash  Neurological: Positive for numbness  Negative for dizziness, tremors, weakness, light-headedness and headaches  Hematological: Negative for adenopathy  Does not bruise/bleed easily  Psychiatric/Behavioral: Negative for confusion and sleep disturbance  Patient Active Problem List   Diagnosis   • Bilateral carpal tunnel syndrome   • GERD without esophagitis   • Migraine with aura and without status migrainosus, not intractable   • MRI of brain abnormal   • Positive ARTURO (antinuclear antibody)   • Tic disorder   • Vision disturbance   • Obesity due to excess calories, unspecified obesity severity   • Abnormal blood chemistry level   • Leukocytosis       Past Medical History:   Diagnosis Date   • Anxiety    • Depression    • GERD (gastroesophageal reflux disease)    • Hypertension    • No known health problems        Past Surgical History:   Procedure Laterality Date   • NO PAST SURGERIES         Family History   Problem Relation Age of Onset   • Breast cancer Mother    • Colon cancer Maternal Uncle    • Ovarian cancer Neg Hx    • Uterine cancer Neg Hx    • Cervical cancer Neg Hx        Social History     Socioeconomic History   • Marital status: Single     Spouse name: None   • Number of children: None   • Years of education: None   • Highest education level: None   Occupational History   • None   Tobacco Use   • Smoking status: Current Some Day Smoker   • Smokeless tobacco: Never Used   Vaping Use   • Vaping Use: Never used   Substance and Sexual Activity   • Alcohol use:  Yes   • Drug use: Never   • Sexual activity: Yes     Partners: Male     Birth control/protection: Condom   Other Topics Concern   • None   Social History Narrative   • None     Social Determinants of Health     Financial Resource Strain: Not on file   Food Insecurity: Not on file   Transportation Needs: Not on file   Physical Activity: Insufficiently Active   • Days of Exercise per Week: 2 days   • Minutes of Exercise per Session: 60 min   Stress: Stress Concern Present   • Feeling of Stress :  To some extent   Social Connections: Not on file   Intimate Partner Violence: Not on file   Housing Stability: Not on file         Current Outpatient Medications:   •  DULoxetine (CYMBALTA) 30 mg delayed release capsule, Take 30 mg by mouth daily, Disp: , Rfl:   •  famotidine (PEPCID) 20 mg tablet, Take 1 tablet (20 mg total) by mouth 2 (two) times a day, Disp: 30 tablet, Rfl: 0  •  fluticasone (FLONASE) 50 mcg/act nasal spray, 2 sprays into each nostril daily, Disp: , Rfl:   •  furosemide (LASIX) 40 mg tablet, Take 1 tablet by mouth daily, Disp: , Rfl:   •  hydrocortisone 2 5 % lotion, Apply topically 2 (two) times a day, Disp: 59 mL, Rfl: 0  •  hydrOXYzine HCL (ATARAX) 25 mg tablet, Take 25 mg by mouth every 6 (six) hours as needed, Disp: , Rfl:   •  methocarbamol (ROBAXIN) 750 mg tablet, Take 750 mg by mouth 4 (four) times a day as needed, Disp: , Rfl:   •  pantoprazole (PROTONIX) 40 mg tablet, Take 1 tablet (40 mg total) by mouth 2 (two) times a day, Disp: 60 tablet, Rfl: 1  •  sucralfate (CARAFATE) 1 g tablet, Take 1 tablet (1 g total) by mouth 4 (four) times a day, Disp: 60 tablet, Rfl: 0  •  triamcinolone (KENALOG) 0 1 % ointment, Apply topically 2 (two) times a day, Disp: 30 g, Rfl: 0    Allergies   Allergen Reactions   • Azithromycin GI Intolerance       Objective:  /82 (BP Location: Left arm, Patient Position: Sitting, Cuff Size: Large)   Pulse 74   Temp 98 °F (36 7 °C) (Temporal)   Resp 14   Ht 5' 8" (1 727 m)   Wt 125 kg (276 lb)   SpO2 98%   BMI 41 97 kg/m²    Physical Exam  Constitutional:       General: She is not in acute distress  Appearance: Normal appearance  She is not ill-appearing  HENT:      Head: Atraumatic  Eyes:      Extraocular Movements: Extraocular movements intact  Conjunctiva/sclera: Conjunctivae normal    Cardiovascular:      Rate and Rhythm: Normal rate and regular rhythm  Pulses: Normal pulses  Heart sounds: Normal heart sounds  No murmur heard  Pulmonary:      Effort: Pulmonary effort is normal  No respiratory distress  Breath sounds: Normal breath sounds  Abdominal:      General: Bowel sounds are normal  There is no distension  Palpations: Abdomen is soft  Tenderness: There is no abdominal tenderness  Musculoskeletal:      Cervical back: Normal range of motion  No tenderness  Right lower leg: No edema  Left lower leg: No edema  Lymphadenopathy:      Cervical: No cervical adenopathy  Skin:     General: Skin is warm and dry  Capillary Refill: Capillary refill takes less than 2 seconds  Findings: No bruising or lesion  Neurological:      General: No focal deficit present  Mental Status: She is alert and oriented to person, place, and time  Mental status is at baseline  Motor: No weakness  Gait: Gait normal    Psychiatric:         Mood and Affect: Mood normal          Behavior: Behavior normal          Thought Content:  Thought content normal          Judgment: Judgment normal          Result Review  Labs:  Office Visit on 11/03/2022   Component Date Value Ref Range Status   • URINE HCG 11/03/2022 normal   Final   • N gonorrhoeae, DNA Probe 11/03/2022 Negative  Negative Final   • Chlamydia trachomatis, DNA Probe 11/03/2022 Negative  Negative Final   Appointment on 10/08/2022   Component Date Value Ref Range Status   • WBC 10/08/2022 14 46 (A) 4 31 - 10 16 Thousand/uL Final   • RBC 10/08/2022 4 85  3 81 - 5 12 Million/uL Final   • Hemoglobin 10/08/2022 12 2  11 5 - 15 4 g/dL Final   • Hematocrit 10/08/2022 40 2  34 8 - 46 1 % Final   • MCV 10/08/2022 83  82 - 98 fL Final   • MCH 10/08/2022 25 2 (A) 26 8 - 34 3 pg Final   • MCHC 10/08/2022 30 3 (A) 31 4 - 37 4 g/dL Final   • RDW 10/08/2022 16 8 (A) 11 6 - 15 1 % Final   • MPV 10/08/2022 11 4  8 9 - 12 7 fL Final   • Platelets 57/33/4324 336  149 - 390 Thousands/uL Final   • nRBC 10/08/2022 0  /100 WBCs Final   • Segmented Neutrophils Manual 10/08/2022 61  45 - 77 % Final   • Bands Manual 10/08/2022 0  Thousand/uL Final   • Lymphocytes Manual 10/08/2022 30  14 - 44 % Final   • Monocytes Manual 10/08/2022 5  4 - 12 % Final   • Eosinophils Manual 10/08/2022 2  0 - 6 % Final   • Atypical Lymphocytes Relative 10/08/2022 2 (A) 0 - 0 % Final   • Total Counted 10/08/2022 100   Final   • Anisocytosis 10/08/2022 Present   Final   • Platelet Estimate 80/35/1249 Adequate  Adequate Final   • Path Review 10/08/2022 Leukocytosis (WBC 14 46 thousand/uL) with neutrophilia (61 9%)  No overt pathologic abnormality noted in this peripheral smear  Correlate clinically, and with concurrent laboratory studies (culture, flow cytometry, etc )  Final       Imaging:   10/5/22: US LUQ  IMPRESSION:  1  Spleen is normal in size  2   Left renal cyst and tiny nonobstructing left renal calculus  3   The etiology of patient's clinical symptomatology is not identified on ultrasound  Please note: This report has been generated by a voice recognition software system  Therefore there may be syntax, spelling, and/or grammatical errors  Please call if you have any questions

## 2022-11-10 ENCOUNTER — TELEPHONE (OUTPATIENT)
Dept: DERMATOLOGY | Facility: CLINIC | Age: 37
End: 2022-11-10

## 2022-11-10 NOTE — TELEPHONE ENCOUNTER
Telephone call to pt in regards to scheduling a sooner appt for pt's rash/lesions on back per Dr Jeanna Hou  No answer  LVM for pt to give me a call back at her earliest convenience

## 2022-11-14 ENCOUNTER — TELEPHONE (OUTPATIENT)
Dept: INTERNAL MEDICINE CLINIC | Facility: CLINIC | Age: 37
End: 2022-11-14

## 2022-11-14 NOTE — TELEPHONE ENCOUNTER
Needs a letter that she can keep working  The letter would have to note her limitations and/or accommodations    She can let Ena Grahamiris know what she's experiencing  She's still keeping her appt for 11/17

## 2022-11-15 ENCOUNTER — TELEPHONE (OUTPATIENT)
Dept: DERMATOLOGY | Facility: CLINIC | Age: 37
End: 2022-11-15

## 2022-11-15 LAB — METHYLMALONATE SERPL-SCNC: 128 NMOL/L (ref 0–378)

## 2022-11-15 NOTE — TELEPHONE ENCOUNTER
Telephone call to pt in regards to trying to get her a sooner appt per patient and Dr Kristian Rosas  No answer  LVM for pt to give me a call back at her earliest convenience

## 2022-11-17 ENCOUNTER — APPOINTMENT (OUTPATIENT)
Dept: LAB | Facility: CLINIC | Age: 37
End: 2022-11-17

## 2022-11-17 ENCOUNTER — OFFICE VISIT (OUTPATIENT)
Dept: INTERNAL MEDICINE CLINIC | Facility: CLINIC | Age: 37
End: 2022-11-17

## 2022-11-17 VITALS
WEIGHT: 277.4 LBS | TEMPERATURE: 98.9 F | SYSTOLIC BLOOD PRESSURE: 132 MMHG | OXYGEN SATURATION: 99 % | HEIGHT: 68 IN | HEART RATE: 89 BPM | BODY MASS INDEX: 42.04 KG/M2 | DIASTOLIC BLOOD PRESSURE: 80 MMHG

## 2022-11-17 DIAGNOSIS — D50.9 IRON DEFICIENCY ANEMIA, UNSPECIFIED IRON DEFICIENCY ANEMIA TYPE: ICD-10-CM

## 2022-11-17 DIAGNOSIS — Z23 ENCOUNTER FOR IMMUNIZATION: ICD-10-CM

## 2022-11-17 DIAGNOSIS — D50.9 IRON DEFICIENCY ANEMIA, UNSPECIFIED IRON DEFICIENCY ANEMIA TYPE: Primary | ICD-10-CM

## 2022-11-17 DIAGNOSIS — E53.8 VITAMIN B 12 DEFICIENCY: ICD-10-CM

## 2022-11-17 RX ORDER — CYANOCOBALAMIN 1000 UG/ML
1000 INJECTION, SOLUTION INTRAMUSCULAR; SUBCUTANEOUS WEEKLY
Status: SHIPPED | OUTPATIENT
Start: 2022-11-17

## 2022-11-17 NOTE — LETTER
November 17, 2022     Patient: Silvio Chester  YOB: 1985  Date of Visit: 11/17/2022      To Whom it May Concern:    Silvio Chester is under my professional care  Lucio Novoa was seen in my office on 11/17/2022  April  May return to work with frequent rest breaks as needed for fatigue and pain  Patient may need to take days off for doctor appts of if she is having an exacerbation  If you have any questions or concerns, please don't hesitate to call           Sincerely,          SEBASTIEN Madison        CC: No Recipients

## 2022-11-17 NOTE — PROGRESS NOTES
INTERNAL MEDICINE FOLLOW-UP VISIT  [facility]    NAME: Lila Navarro  AGE: 29 y o  SEX: female  : 1985     DATE: 2022     Assessment and Plan:   Iron deficiency anemia, unspecified iron deficiency anemia type  Rule out celiac which may also have caused vitamin deficiency  - Celiac Disease Antibody Profile; Future  - Occult Blood, Fecal Immunochemical; Future    Vitamin B 12 deficiency  Vitamin B 12 level 300 MMA was normal, still consider this a precursor to vitamin B12 deficiency with the inclusion of lower extremity and hands with  Tingling, as well as myalgias  Will start vitamin B 12 injections 1 injection weekly for 4 weeks then monthly  Patient will obtain injections here in the office as she does not want to inject herself  - cyanocobalamin injection 1,000 mcg    Patient to return in 3 weeks        Return today (on 2022)  Chief Complaint:     Chief Complaint   Patient presents with   • Losing Voice     For the last couple of months on and off  Patient stated that she has chest rattling and a cough    • Work Accomadation       History of Present Illness:     Patient is here today with complaints of myalgias well as tingling to hands and feet  She does have some labs that are in the system that were reviewed today with patient  She denies any blood in her stools  She does complain of irregular periods but she does not describe them as being excessively heavy  She does state that her uncle had passed away cancer but she is not sure what kind of cancer      The following portions of the patient's history were reviewed and updated as appropriate: allergies, current medications, past family history, past medical history, past social history, past surgical history and problem list      Review of Systems:     Review of Systems   Constitutional: Negative for appetite change, chills, diaphoresis, fatigue, fever and unexpected weight change  HENT: Negative for postnasal drip and sneezing  Eyes: Negative for visual disturbance  Respiratory: Negative for chest tightness and shortness of breath  Cardiovascular: Negative for chest pain, palpitations and leg swelling  Gastrointestinal: Negative for abdominal pain and blood in stool  Endocrine: Negative for cold intolerance, heat intolerance, polydipsia, polyphagia and polyuria  Genitourinary: Negative for difficulty urinating, dysuria, frequency and urgency  Musculoskeletal: Negative for arthralgias and myalgias  Skin: Negative for rash and wound  Neurological: Positive for weakness and numbness  Negative for dizziness, light-headedness and headaches  Hematological: Negative for adenopathy  Psychiatric/Behavioral: Negative for confusion, dysphoric mood and sleep disturbance  The patient is not nervous/anxious  Past Medical History:     Past Medical History:   Diagnosis Date   • Anxiety    • Depression    • GERD (gastroesophageal reflux disease)    • Hypertension    • No known health problems         Current Medications:   No current outpatient medications on file  Current Facility-Administered Medications:   •  cyanocobalamin injection 1,000 mcg, 1,000 mcg, Intramuscular, Weekly, SEBASTIEN Conklin     Allergies: Allergies   Allergen Reactions   • Azithromycin GI Intolerance        Physical Exam:     /80 (BP Location: Left arm, Patient Position: Standing, Cuff Size: Standard)   Pulse 89   Temp 98 9 °F (37 2 °C) (Temporal)   Ht 5' 8" (1 727 m)   Wt 126 kg (277 lb 6 4 oz)   SpO2 99%   BMI 42 18 kg/m²     Physical Exam  Constitutional:       Appearance: She is well-developed and well-nourished  HENT:      Head: Normocephalic and atraumatic  Eyes:      Pupils: Pupils are equal, round, and reactive to light  Neck:      Thyroid: No thyromegaly  Cardiovascular:      Rate and Rhythm: Normal rate and regular rhythm        Heart sounds: No murmur heard   Pulmonary:      Effort: Pulmonary effort is normal       Breath sounds: Normal breath sounds  Abdominal:      General: Bowel sounds are normal       Palpations: Abdomen is soft  Musculoskeletal:         General: Normal range of motion  Cervical back: Normal range of motion and neck supple  Lymphadenopathy:      Cervical: No cervical adenopathy  Skin:     General: Skin is warm and dry  Neurological:      Mental Status: She is alert and oriented to person, place, and time             Data:     Laboratory Results: I have personally reviewed the pertinent laboratory results/reports     Rock Irby, [address] [place] [address]

## 2022-11-18 LAB
ENDOMYSIUM IGA SER QL: NEGATIVE
GLIADIN PEPTIDE IGA SER-ACNC: 3 UNITS (ref 0–19)
GLIADIN PEPTIDE IGG SER-ACNC: 2 UNITS (ref 0–19)
IGA SERPL-MCNC: 440 MG/DL (ref 87–352)
TTG IGA SER-ACNC: <2 U/ML (ref 0–3)
TTG IGG SER-ACNC: <2 U/ML (ref 0–5)

## 2022-11-21 ENCOUNTER — TELEPHONE (OUTPATIENT)
Dept: INTERNAL MEDICINE CLINIC | Facility: CLINIC | Age: 37
End: 2022-11-21

## 2022-11-21 ENCOUNTER — CLINICAL SUPPORT (OUTPATIENT)
Dept: INTERNAL MEDICINE CLINIC | Facility: CLINIC | Age: 37
End: 2022-11-21

## 2022-11-21 DIAGNOSIS — E53.8 VITAMIN B 12 DEFICIENCY: ICD-10-CM

## 2022-11-21 RX ADMIN — CYANOCOBALAMIN 1000 MCG: 1000 INJECTION, SOLUTION INTRAMUSCULAR; SUBCUTANEOUS at 10:49

## 2022-11-21 NOTE — TELEPHONE ENCOUNTER
----- Message from Adilene Abel, [address]  sent at 11/21/2022  7:56 AM EST -----  You do have an abnormal antibody in your celiac panel  The definitive way to identify and diagnose celiac is through an EGD which it looks like your GI doctor has ordered for you   Make sure to schedule that appt

## 2022-11-23 LAB — PROT SERPL-MCNC: 8 G/DL (ref 6.4–8.2)

## 2022-11-29 ENCOUNTER — TELEPHONE (OUTPATIENT)
Dept: INTERNAL MEDICINE CLINIC | Facility: CLINIC | Age: 37
End: 2022-11-29

## 2022-11-29 NOTE — TELEPHONE ENCOUNTER
----- Message from [id number] sent at 11/29/2022 10:25 AM EST -----  Regarding: FW: Help   if you can  Contact: [phone]  She should go to the ER, I have no openings  ----- Message -----  From: Linnette Suresh  Sent: 11/29/2022  10:15 AM EST  To: SEBASTIEN Al  Subject: FW: Help   if you can                               ----- Message -----  From: Alie Burnett  Sent: 11/29/2022   9:36 AM EST  To: [facility]  Subject: Help   if you can                                 I woke up puking bile and what looks like blood, I'm not even sure, I was running a high fever with chills and a headache and now puking    I felt fine after a few doses of nyquil

## 2022-12-05 ENCOUNTER — OFFICE VISIT (OUTPATIENT)
Dept: INTERNAL MEDICINE CLINIC | Facility: CLINIC | Age: 37
End: 2022-12-05

## 2022-12-05 VITALS
HEART RATE: 90 BPM | OXYGEN SATURATION: 98 % | DIASTOLIC BLOOD PRESSURE: 80 MMHG | WEIGHT: 279.2 LBS | HEIGHT: 68 IN | SYSTOLIC BLOOD PRESSURE: 130 MMHG | BODY MASS INDEX: 42.31 KG/M2 | TEMPERATURE: 99 F

## 2022-12-05 DIAGNOSIS — Z23 ENCOUNTER FOR IMMUNIZATION: ICD-10-CM

## 2022-12-05 DIAGNOSIS — I10 PRIMARY HYPERTENSION: Primary | ICD-10-CM

## 2022-12-05 DIAGNOSIS — R76.8 ANA POSITIVE: ICD-10-CM

## 2022-12-05 DIAGNOSIS — M79.10 MYALGIA: ICD-10-CM

## 2022-12-05 RX ORDER — FUROSEMIDE 20 MG/1
20 TABLET ORAL DAILY
COMMUNITY

## 2022-12-05 RX ORDER — HYDROCHLOROTHIAZIDE 25 MG/1
25 TABLET ORAL DAILY
Qty: 30 TABLET | Refills: 3 | Status: SHIPPED | OUTPATIENT
Start: 2022-12-05

## 2022-12-05 RX ORDER — PREDNISONE 10 MG/1
TABLET ORAL
Qty: 30 TABLET | Refills: 0 | Status: SHIPPED | OUTPATIENT
Start: 2022-12-05

## 2022-12-05 NOTE — PROGRESS NOTES
INTERNAL MEDICINE FOLLOW-UP VISIT  [facility]    NAME: Nereida Vail  AGE: 40 y o  SEX: female  : 1985     DATE: 2022     Assessment and Plan:   1  Primary hypertension  Swelling to BLE along with elevated BP on 3 consecutive visits  Limit salt in diet, increase water intake to a minimum of 60-70 oz per day  - hydrochlorothiazide (HYDRODIURIL) 25 mg tablet; Take 1 tablet (25 mg total) by mouth daily  Dispense: 30 tablet; Refill: 3    2  ARTURO positive  Will refer to rheumatology due to elevated sed, CRP as well as positive ARTURO  - Ambulatory Referral to Rheumatology; Future    3  Myalgia  - predniSONE 10 mg tablet; Take 4 tablets for 3 days then 3 tablets for 3 days then 2 tablet for 3 days then 1 tab for 3 days  Dispense: 30 tablet; Refill: 0  Tape dose for elevated inflammatory markers as well as joint pain    Follow up in 2 weeks with physician   B12 injection today    EGD- January scheduled      No follow-ups on file  Chief Complaint:     Chief Complaint   Patient presents with   • Follow-up     2 weeks after B12      History of Present Illness:     Patient is here today for a follow up  She continues with joint pain, BLE swelling as well as "pressure" to BLE  She follows with hem/onc who suggested iron infusions  She continues with elevated Sed and CRP as well as abnormal ARTURO  She was seen last year by rheumatology out of network and no cause was determined at that time  The following portions of the patient's history were reviewed and updated as appropriate: allergies, current medications, past family history, past medical history, past social history, past surgical history and problem list      Review of Systems:     Review of Systems   Constitutional: Negative for appetite change, chills, diaphoresis, fatigue, fever and unexpected weight change  HENT: Negative for postnasal drip and sneezing      Eyes: Negative for visual disturbance  Respiratory: Negative for chest tightness and shortness of breath  Cardiovascular: Positive for leg swelling  Negative for chest pain and palpitations  Gastrointestinal: Negative for abdominal pain and blood in stool  Endocrine: Negative for cold intolerance, heat intolerance, polydipsia, polyphagia and polyuria  Genitourinary: Negative for difficulty urinating, dysuria, frequency and urgency  Musculoskeletal: Positive for arthralgias  Negative for myalgias  Skin: Negative for rash and wound  Neurological: Negative for dizziness, weakness, light-headedness and headaches  Hematological: Negative for adenopathy  Psychiatric/Behavioral: Negative for confusion, dysphoric mood and sleep disturbance  The patient is not nervous/anxious  Past Medical History:     Past Medical History:   Diagnosis Date   • Anxiety    • Depression    • GERD (gastroesophageal reflux disease)    • Hypertension    • No known health problems         Current Medications:     Current Outpatient Medications:   •  furosemide (LASIX) 20 mg tablet, Take 20 mg by mouth in the morning, Disp: , Rfl:     Current Facility-Administered Medications:   •  cyanocobalamin injection 1,000 mcg, 1,000 mcg, Intramuscular, Weekly, SEBASTIEN Chappell, 1,000 mcg at 11/21/22 1049     Allergies: Allergies   Allergen Reactions   • Azithromycin GI Intolerance        Physical Exam:     /80 (BP Location: Left arm, Patient Position: Sitting, Cuff Size: Standard)   Pulse 90   Temp 99 °F (37 2 °C) (Temporal)   Ht 5' 8" (1 727 m)   Wt 127 kg (279 lb 3 2 oz)   SpO2 98%   BMI 42 45 kg/m²     Physical Exam  Constitutional:       Appearance: She is well-developed and well-nourished  HENT:      Head: Normocephalic and atraumatic  Eyes:      Pupils: Pupils are equal, round, and reactive to light  Neck:      Thyroid: No thyromegaly  Cardiovascular:      Rate and Rhythm: Normal rate and regular rhythm        Heart sounds: No murmur heard  Pulmonary:      Effort: Pulmonary effort is normal       Breath sounds: Normal breath sounds  Abdominal:      General: Bowel sounds are normal       Palpations: Abdomen is soft  Musculoskeletal:         General: Normal range of motion  Cervical back: Normal range of motion and neck supple  Lymphadenopathy:      Cervical: No cervical adenopathy  Skin:     General: Skin is warm and dry  Neurological:      Mental Status: She is alert and oriented to person, place, and time  Data:     Laboratory Results: I have personally reviewed the pertinent laboratory results/reports   Radiology/Other Diagnostic Testing Results: I have personally reviewed pertinent reports        SEBASTIEN Amezquita  [facility]

## 2022-12-05 NOTE — LETTER
December 5, 2022     Patient: Debra Park  YOB: 1985  Date of Visit: 12/5/2022      To Whom it May Concern:    Debra Park is under my professional care  Lucero Henry was seen in my office on 12/5/2022  Lucero Henry may return to work on 12/06/2022  If you have any questions or concerns, please don't hesitate to call           Sincerely,          SEBASTIEN Kaur        CC: No Recipients

## 2022-12-19 ENCOUNTER — APPOINTMENT (OUTPATIENT)
Dept: LAB | Facility: CLINIC | Age: 37
End: 2022-12-19

## 2022-12-19 ENCOUNTER — OFFICE VISIT (OUTPATIENT)
Dept: INTERNAL MEDICINE CLINIC | Facility: CLINIC | Age: 37
End: 2022-12-19

## 2022-12-19 VITALS
SYSTOLIC BLOOD PRESSURE: 118 MMHG | DIASTOLIC BLOOD PRESSURE: 78 MMHG | OXYGEN SATURATION: 98 % | WEIGHT: 270 LBS | BODY MASS INDEX: 41.05 KG/M2 | TEMPERATURE: 97.7 F | RESPIRATION RATE: 18 BRPM | HEART RATE: 99 BPM

## 2022-12-19 DIAGNOSIS — M79.604 PAIN IN BOTH LOWER EXTREMITIES: ICD-10-CM

## 2022-12-19 DIAGNOSIS — E61.1 IRON DEFICIENCY: ICD-10-CM

## 2022-12-19 DIAGNOSIS — L60.0 INGROWN NAIL OF GREAT TOE: ICD-10-CM

## 2022-12-19 DIAGNOSIS — M79.605 PAIN IN BOTH LOWER EXTREMITIES: ICD-10-CM

## 2022-12-19 DIAGNOSIS — E55.9 VITAMIN D DEFICIENCY: ICD-10-CM

## 2022-12-19 DIAGNOSIS — R73.03 PRE-DIABETES: Primary | ICD-10-CM

## 2022-12-19 DIAGNOSIS — R60.0 LOWER EXTREMITY EDEMA: ICD-10-CM

## 2022-12-19 DIAGNOSIS — R53.1 WEAKNESS: ICD-10-CM

## 2022-12-19 DIAGNOSIS — E53.8 VITAMIN B 12 DEFICIENCY: ICD-10-CM

## 2022-12-19 LAB — URATE SERPL-MCNC: 9 MG/DL (ref 2–7.5)

## 2022-12-19 RX ORDER — ERGOCALCIFEROL 1.25 MG/1
50000 CAPSULE ORAL WEEKLY
Qty: 8 CAPSULE | Refills: 0 | Status: SHIPPED | OUTPATIENT
Start: 2022-12-19 | End: 2023-02-07

## 2022-12-19 RX ORDER — GABAPENTIN 300 MG/1
300 CAPSULE ORAL
Qty: 30 CAPSULE | Refills: 0 | Status: SHIPPED | OUTPATIENT
Start: 2022-12-19

## 2022-12-19 RX ORDER — FERROUS SULFATE TAB EC 324 MG (65 MG FE EQUIVALENT) 324 (65 FE) MG
324 TABLET DELAYED RESPONSE ORAL
Qty: 60 TABLET | Refills: 2 | Status: SHIPPED | OUTPATIENT
Start: 2022-12-19 | End: 2023-03-19

## 2022-12-19 RX ADMIN — CYANOCOBALAMIN 1000 MCG: 1000 INJECTION, SOLUTION INTRAMUSCULAR; SUBCUTANEOUS at 11:27

## 2022-12-19 NOTE — PROGRESS NOTES
Name: Kathy Pierson      : 1985      MRN: [id number]  Encounter Provider: Neeru Maya DO  Encounter Date: 2022   Encounter department: [facility]  [address]     1  Pre-diabetes-a1c of 6 0 dietary changes reviewed with pt      2  Vitamin B 12 deficiency- B12 within range  Pt will hold injections for a few months and repeat levels     3  Lower extremity edema-  7  Pain in both lower extremities- onset x2 years  Extensive work up  Reviewed all labs and visit with pt  significant elevation in esr and crp but otherwise no other positive rheumatologic studies  Currently no significant pitting edema on exam  picture brought in by pt showed erythema and edema around the knee and upper tibia/fibia  There hasn't been evaluated for gout   Will obtain imaging and uric acid level  Will trial gabapentin for possible neuropathy (see below)   -     gabapentin (Neurontin) 300 mg capsule; Take 1 capsule (300 mg total) by mouth daily at bedtime  -     XR tibia fibula 2 vw left; Future; Expected date: 2022  -     XR femur 1 vw left; Future; Expected date: 2022  -     Uric acid; Future  -     Ambulatory Referral to Allergy; Future  4  Weakness- pt reports trouble with ambulation and weaknes even without significant swelling  Will evaluate for neuropathy   -     EMG 1 Upper/1 Lower Neuropathy; Future    5  Iron deficiency-noted on labs  Will escudero supplement  -     ferrous sulfate 324 (65 Fe) mg; Take 1 tablet (324 mg total) by mouth 2 (two) times a day before meals      6  Ingrown nail of great toe  -     Ambulatory Referral to Podiatry; Future    8  Vitamin D deficiency-noted on labs  Will start supplmentation  -     ergocalciferol (VITAMIN D2) 50,000 units; Take 1 capsule (50,000 Units total) by mouth once a week for 8 doses           Subjective      Presents for second opinion  Has been dealing with lower ext edema for two years   Seen multiple specialist  Has had multiple labs  Recently finished prednisone taper  Still had an episode of swelling the legs  Mostly  Upper calf and around thigh and knee  Hard to walk due to swelling  Has a picture of when her swelling is bad  Most recent episode was yesterday     Review of Systems   Constitutional: Negative for fatigue and fever  Cardiovascular: Positive for leg swelling  Neurological: Positive for weakness and numbness  Current Outpatient Medications on File Prior to Visit   Medication Sig   • furosemide (LASIX) 20 mg tablet Take 20 mg by mouth in the morning   • hydrochlorothiazide (HYDRODIURIL) 25 mg tablet Take 1 tablet (25 mg total) by mouth daily   • predniSONE 10 mg tablet Take 4 tablets for 3 days then 3 tablets for 3 days then 2 tablet for 3 days then 1 tab for 3 days (Patient not taking: Reported on 12/19/2022)       Objective     /78 (BP Location: Left arm, Patient Position: Sitting, Cuff Size: Large)   Pulse 99   Temp 97 7 °F (36 5 °C) (Temporal)   Resp 18   Wt 122 kg (270 lb)   SpO2 98%   BMI 41 05 kg/m²     Physical Exam  HENT:      Head: Normocephalic and atraumatic  Cardiovascular:      Rate and Rhythm: Normal rate  Pulmonary:      Effort: Pulmonary effort is normal    Musculoskeletal:         General: Swelling (mild, non pitting, around knee and upper calf b/l ) present  Right lower leg: No edema  Left lower leg: No edema  Neurological:      Mental Status: She is alert  Jett Gillis DO  Depression Screening Follow-up Plan: Patient's depression screening was positive with a PHQ-2 score of 4  Their PHQ-9 score was 16  Patient assessed for underlying major depression  They have no active suicidal ideations  Brief counseling provided and recommend additional follow-up/re-evaluation next office visit

## 2022-12-20 ENCOUNTER — TELEPHONE (OUTPATIENT)
Dept: INTERNAL MEDICINE CLINIC | Facility: CLINIC | Age: 37
End: 2022-12-20

## 2022-12-20 DIAGNOSIS — E79.0 ELEVATED BLOOD URIC ACID LEVEL: Primary | ICD-10-CM

## 2022-12-20 DIAGNOSIS — M1A.0690 CHRONIC GOUT OF KNEE, UNSPECIFIED CAUSE, UNSPECIFIED LATERALITY: ICD-10-CM

## 2022-12-20 RX ORDER — ALLOPURINOL 100 MG/1
TABLET ORAL
Qty: 30 TABLET | Refills: 0 | Status: SHIPPED | OUTPATIENT
Start: 2022-12-20 | End: 2023-01-19

## 2022-12-20 RX ORDER — COLCHICINE 0.6 MG/1
TABLET ORAL
Qty: 3 TABLET | Refills: 0 | Status: SHIPPED | OUTPATIENT
Start: 2022-12-20

## 2022-12-20 NOTE — TELEPHONE ENCOUNTER
----- Message from Fan Staff, DO sent at 12/20/2022  9:52 AM EST -----  Uric acid level is elevated  Swelling potential secondary to gout  Will send a dose of colchicine for the patient to take today and then starting tomorrow she is to take allopurinol daily  There is a repeat uric acid blood test to get completed in the next 4 weeks  She is to follow up with her pcp in 4 weeks to see if this resolve the issue

## 2022-12-22 ENCOUNTER — OFFICE VISIT (OUTPATIENT)
Dept: PODIATRY | Facility: CLINIC | Age: 37
End: 2022-12-22

## 2022-12-22 VITALS
HEART RATE: 85 BPM | WEIGHT: 267 LBS | OXYGEN SATURATION: 98 % | SYSTOLIC BLOOD PRESSURE: 109 MMHG | HEIGHT: 68 IN | DIASTOLIC BLOOD PRESSURE: 79 MMHG | BODY MASS INDEX: 40.47 KG/M2

## 2022-12-22 DIAGNOSIS — L60.0 INGROWN NAIL OF GREAT TOE: ICD-10-CM

## 2022-12-22 DIAGNOSIS — I87.2 VENOUS INSUFFICIENCY OF BOTH LOWER EXTREMITIES: Primary | ICD-10-CM

## 2022-12-22 NOTE — LETTER
December 22, 2022     Emory Canas DO  [address] [facility] [address]    Patient: Ruy Milligan   YOB: 1985   Date of Visit: 12/22/2022       Dear Dr Radha Oh: Thank you for referring Ruy Milligan to me for evaluation  Below are my notes for this consultation  If you have questions, please do not hesitate to call me  I look forward to following your patient along with you  Sincerely,        Alessandra Figueroa DPM        CC: No Recipients  [employer]  12/22/2022  1:50 PM  Signed  Assessment/Plan:    The patient's clinical examination today is consistent with bilateral lower extremity edema concerning for possible venous insufficiency  She was referred for a venous duplex with reflux testing of the bilateral lower extremities  There is also a tender ingrown nail to the medial border of the left hallux that was debrided with a slant back procedure utilizing ethyl chloride for topical anesthesia  Her recent lab work was reviewed and her uric acid was elevated at 9 0  She has been prescribed a course of colchicine by her primary care physician  Recommend follow-up in 2 to 3 weeks to review venous duplex study and to reevaluate the ingrown nail  Diagnoses and all orders for this visit:    Venous insufficiency of both lower extremities    Ingrown nail of great toe  -     Ambulatory Referral to Podiatry  -     VAS reflux lower limb venous duplex study with reflux assessment, complete bilateral; Future         Subjective:     Patient ID: Ruy Milligan is a 40 y o  female  The patient presents today for her initial consultation with [facility] podiatry group with a chief complaint of bilateral foot pain and swelling  She states that the edema in her lower extremities tends to increase as the day progresses  She does note a history of neuropathy of both feet and is currently on gabapentin    She was also recently diagnosed with gout and started on colchicine  She also notes the possibility of a ingrown toenail to her left great toe as it seems to be tender but not consistently so and she feels that her neuropathy may be at play here to some degree  The following portions of the patient's history were reviewed and updated as appropriate: allergies, current medications, past family history, past medical history, past social history, past surgical history and problem list       PAST MEDICAL HISTORY:  Past Medical History:   Diagnosis Date   • Anxiety    • Depression    • GERD (gastroesophageal reflux disease)    • Hypertension    • No known health problems        PAST SURGICAL HISTORY:  Past Surgical History:   Procedure Laterality Date   • NO PAST SURGERIES          ALLERGIES:  Azithromycin    MEDICATIONS:  Current Outpatient Medications   Medication Sig Dispense Refill   • allopurinol (ZYLOPRIM) 100 mg tablet Take 1 tablet (100 mg total) by mouth daily for 7 days, THEN 2 tablets (200 mg total) daily for 23 days   30 tablet 0   • colchicine (COLCRYS) 0 6 mg tablet Take 2 tablets now then 1 tablet an hour later 3 tablet 0   • ergocalciferol (VITAMIN D2) 50,000 units Take 1 capsule (50,000 Units total) by mouth once a week for 8 doses 8 capsule 0   • ferrous sulfate 324 (65 Fe) mg Take 1 tablet (324 mg total) by mouth 2 (two) times a day before meals 60 tablet 2   • furosemide (LASIX) 20 mg tablet Take 20 mg by mouth in the morning     • gabapentin (Neurontin) 300 mg capsule Take 1 capsule (300 mg total) by mouth daily at bedtime 30 capsule 0   • hydrochlorothiazide (HYDRODIURIL) 25 mg tablet Take 1 tablet (25 mg total) by mouth daily 30 tablet 3     Current Facility-Administered Medications   Medication Dose Route Frequency Provider Last Rate Last Admin   • cyanocobalamin injection 1,000 mcg  1,000 mcg Intramuscular Weekly SEBASTIEN Dong   1,000 mcg at 12/19/22 1127       SOCIAL HISTORY:  Social History     Socioeconomic History   • Marital status: Single     Spouse name: None   • Number of children: None   • Years of education: None   • Highest education level: None   Occupational History   • None   Tobacco Use   • Smoking status: Some Days     Packs/day: 0 25     Years: 10 00     Pack years: 2 50     Types: Cigarettes   • Smokeless tobacco: Never   Vaping Use   • Vaping Use: Never used   Substance and Sexual Activity   • Alcohol use: Yes     Alcohol/week: 1 0 standard drink     Types: 1 Glasses of wine per week   • Drug use: Yes     Types: Marijuana     Comment: Patient has medical [employer] card   • Sexual activity: Yes     Partners: Male     Birth control/protection: Condom   Other Topics Concern   • None   Social History Narrative   • None     Social Determinants of Health     Financial Resource Strain: Not on file   Food Insecurity: Not on file   Transportation Needs: Not on file   Physical Activity: Insufficiently Active   • Days of Exercise per Week: 2 days   • Minutes of Exercise per Session: 60 min   Stress: Stress Concern Present   • Feeling of Stress : To some extent   Social Connections: Not on file   Intimate Partner Violence: Not on file   Housing Stability: Not on file        Review of Systems   Constitutional: Negative for chills and fever  HENT: Negative for ear pain and sore throat  Eyes: Negative for pain and visual disturbance  Respiratory: Negative for cough and shortness of breath  Cardiovascular: Negative for chest pain and palpitations  Gastrointestinal: Negative for abdominal pain and vomiting  Genitourinary: Negative for dysuria and hematuria  Musculoskeletal: Negative for arthralgias and back pain  Skin: Negative for color change and rash  Neurological: Negative for seizures and syncope  Psychiatric/Behavioral: Negative  All other systems reviewed and are negative         Objective:      /79 (BP Location: Left arm, Patient Position: Sitting, Cuff Size: Standard)   Pulse 85   Ht 5' 8" (1 727 m)   Wt 121 kg (267 lb)   SpO2 98%   BMI 40 60 kg/m²         Physical Exam  Constitutional:       Appearance: Normal appearance  HENT:      Head: Normocephalic and atraumatic  Nose: Nose normal    Cardiovascular:      Pulses:           Dorsalis pedis pulses are 2+ on the right side and 2+ on the left side  Posterior tibial pulses are 1+ on the right side and 1+ on the left side  Pulmonary:      Effort: Pulmonary effort is normal    Feet:      Comments: There is an incurvated medial border to the left hallucal nail plate that is moderately tender to palpation; there is no erythema nor edema nor active drainage nor purulence emanating from the medial nail fold    There are multiple callosities to the forefoot bilaterally; the interdigital spaces are clear and without maceration; there is diffuse +1 pitting edema of the bilateral lower extremities today, the patient does state that the edema tends to get worse as the day progresses  Skin:     General: Skin is warm  Capillary Refill: Capillary refill takes less than 2 seconds  Neurological:      General: No focal deficit present  Mental Status: She is alert and oriented to person, place, and time  Psychiatric:         Mood and Affect: Mood normal          Behavior: Behavior normal          Thought Content:  Thought content normal

## 2022-12-22 NOTE — PROGRESS NOTES
Assessment/Plan:    The patient's clinical examination today is consistent with bilateral lower extremity edema concerning for possible venous insufficiency  She was referred for a venous duplex with reflux testing of the bilateral lower extremities  There is also a tender ingrown nail to the medial border of the left hallux that was debrided with a slant back procedure utilizing ethyl chloride for topical anesthesia  Her recent lab work was reviewed and her uric acid was elevated at 9 0  She has been prescribed a course of colchicine by her primary care physician  Recommend follow-up in 2 to 3 weeks to review venous duplex study and to reevaluate the ingrown nail  Diagnoses and all orders for this visit:    Venous insufficiency of both lower extremities    Ingrown nail of great toe  -     Ambulatory Referral to Podiatry  -     VAS reflux lower limb venous duplex study with reflux assessment, complete bilateral; Future          Subjective:      Patient ID: Falguni Araiza is a 40 y o  female  The patient presents today for her initial consultation with [facility] podiatry group with a chief complaint of bilateral foot pain and swelling  She states that the edema in her lower extremities tends to increase as the day progresses  She does note a history of neuropathy of both feet and is currently on gabapentin  She was also recently diagnosed with gout and started on colchicine  She also notes the possibility of a ingrown toenail to her left great toe as it seems to be tender but not consistently so and she feels that her neuropathy may be at play here to some degree        The following portions of the patient's history were reviewed and updated as appropriate: allergies, current medications, past family history, past medical history, past social history, past surgical history and problem list       PAST MEDICAL HISTORY:  Past Medical History:   Diagnosis Date   • Anxiety    • Depression    • GERD (gastroesophageal reflux disease)    • Hypertension    • No known health problems        PAST SURGICAL HISTORY:  Past Surgical History:   Procedure Laterality Date   • NO PAST SURGERIES          ALLERGIES:  Azithromycin    MEDICATIONS:  Current Outpatient Medications   Medication Sig Dispense Refill   • allopurinol (ZYLOPRIM) 100 mg tablet Take 1 tablet (100 mg total) by mouth daily for 7 days, THEN 2 tablets (200 mg total) daily for 23 days  30 tablet 0   • colchicine (COLCRYS) 0 6 mg tablet Take 2 tablets now then 1 tablet an hour later 3 tablet 0   • ergocalciferol (VITAMIN D2) 50,000 units Take 1 capsule (50,000 Units total) by mouth once a week for 8 doses 8 capsule 0   • ferrous sulfate 324 (65 Fe) mg Take 1 tablet (324 mg total) by mouth 2 (two) times a day before meals 60 tablet 2   • furosemide (LASIX) 20 mg tablet Take 20 mg by mouth in the morning     • gabapentin (Neurontin) 300 mg capsule Take 1 capsule (300 mg total) by mouth daily at bedtime 30 capsule 0   • hydrochlorothiazide (HYDRODIURIL) 25 mg tablet Take 1 tablet (25 mg total) by mouth daily 30 tablet 3     Current Facility-Administered Medications   Medication Dose Route Frequency Provider Last Rate Last Admin   • cyanocobalamin injection 1,000 mcg  1,000 mcg Intramuscular Weekly SEBASTIEN Lopez   1,000 mcg at 12/19/22 1127       SOCIAL HISTORY:  Social History     Socioeconomic History   • Marital status: Single     Spouse name: None   • Number of children: None   • Years of education: None   • Highest education level: None   Occupational History   • None   Tobacco Use   • Smoking status: Some Days     Packs/day: 0 25     Years: 10 00     Pack years: 2 50     Types: Cigarettes   • Smokeless tobacco: Never   Vaping Use   • Vaping Use: Never used   Substance and Sexual Activity   • Alcohol use:  Yes     Alcohol/week: 1 0 standard drink     Types: 1 Glasses of wine per week   • Drug use: Yes     Types: Marijuana     Comment: Patient has medical [employer] card   • Sexual activity: Yes     Partners: Male     Birth control/protection: Condom   Other Topics Concern   • None   Social History Narrative   • None     Social Determinants of Health     Financial Resource Strain: Not on file   Food Insecurity: Not on file   Transportation Needs: Not on file   Physical Activity: Insufficiently Active   • Days of Exercise per Week: 2 days   • Minutes of Exercise per Session: 60 min   Stress: Stress Concern Present   • Feeling of Stress : To some extent   Social Connections: Not on file   Intimate Partner Violence: Not on file   Housing Stability: Not on file        Review of Systems   Constitutional: Negative for chills and fever  HENT: Negative for ear pain and sore throat  Eyes: Negative for pain and visual disturbance  Respiratory: Negative for cough and shortness of breath  Cardiovascular: Negative for chest pain and palpitations  Gastrointestinal: Negative for abdominal pain and vomiting  Genitourinary: Negative for dysuria and hematuria  Musculoskeletal: Negative for arthralgias and back pain  Skin: Negative for color change and rash  Neurological: Negative for seizures and syncope  Psychiatric/Behavioral: Negative  All other systems reviewed and are negative  Objective:      /79 (BP Location: Left arm, Patient Position: Sitting, Cuff Size: Standard)   Pulse 85   Ht 5' 8" (1 727 m)   Wt 121 kg (267 lb)   SpO2 98%   BMI 40 60 kg/m²          Physical Exam  Constitutional:       Appearance: Normal appearance  HENT:      Head: Normocephalic and atraumatic  Nose: Nose normal    Cardiovascular:      Pulses:           Dorsalis pedis pulses are 2+ on the right side and 2+ on the left side  Posterior tibial pulses are 1+ on the right side and 1+ on the left side  Pulmonary:      Effort: Pulmonary effort is normal    Feet:      Comments:  There is an incurvated medial border to the left hallucal nail plate that is moderately tender to palpation; there is no erythema nor edema nor active drainage nor purulence emanating from the medial nail fold    There are multiple callosities to the forefoot bilaterally; the interdigital spaces are clear and without maceration; there is diffuse +1 pitting edema of the bilateral lower extremities today, the patient does state that the edema tends to get worse as the day progresses  Skin:     General: Skin is warm  Capillary Refill: Capillary refill takes less than 2 seconds  Neurological:      General: No focal deficit present  Mental Status: She is alert and oriented to person, place, and time  Psychiatric:         Mood and Affect: Mood normal          Behavior: Behavior normal          Thought Content:  Thought content normal

## 2023-01-04 ENCOUNTER — HOSPITAL ENCOUNTER (OUTPATIENT)
Dept: GASTROENTEROLOGY | Facility: HOSPITAL | Age: 38
Setting detail: OUTPATIENT SURGERY
Discharge: HOME | End: 2023-01-04
Attending: INTERNAL MEDICINE

## 2023-01-04 ENCOUNTER — ANESTHESIA (OUTPATIENT)
Dept: GASTROENTEROLOGY | Facility: HOSPITAL | Age: 38
End: 2023-01-04

## 2023-01-04 ENCOUNTER — ANESTHESIA EVENT (OUTPATIENT)
Dept: GASTROENTEROLOGY | Facility: HOSPITAL | Age: 38
End: 2023-01-04

## 2023-01-04 VITALS
RESPIRATION RATE: 11 BRPM | SYSTOLIC BLOOD PRESSURE: 111 MMHG | DIASTOLIC BLOOD PRESSURE: 55 MMHG | HEIGHT: 68 IN | BODY MASS INDEX: 40.8 KG/M2 | TEMPERATURE: 97.9 F | HEART RATE: 83 BPM | OXYGEN SATURATION: 99 % | WEIGHT: 269.18 LBS

## 2023-01-04 DIAGNOSIS — K21.00 GASTROESOPHAGEAL REFLUX DISEASE WITH ESOPHAGITIS, UNSPECIFIED WHETHER HEMORRHAGE: ICD-10-CM

## 2023-01-04 DIAGNOSIS — R10.13 EPIGASTRIC PAIN: ICD-10-CM

## 2023-01-04 DIAGNOSIS — R11.0 NAUSEA: ICD-10-CM

## 2023-01-04 LAB
EXT PREGNANCY TEST URINE: NEGATIVE
EXT. CONTROL: NORMAL

## 2023-01-04 RX ORDER — SODIUM CHLORIDE, SODIUM LACTATE, POTASSIUM CHLORIDE, CALCIUM CHLORIDE 600; 310; 30; 20 MG/100ML; MG/100ML; MG/100ML; MG/100ML
INJECTION, SOLUTION INTRAVENOUS CONTINUOUS PRN
Status: DISCONTINUED | OUTPATIENT
Start: 2023-01-04 | End: 2023-01-04

## 2023-01-04 RX ORDER — LIDOCAINE HYDROCHLORIDE 20 MG/ML
INJECTION, SOLUTION EPIDURAL; INFILTRATION; INTRACAUDAL; PERINEURAL AS NEEDED
Status: DISCONTINUED | OUTPATIENT
Start: 2023-01-04 | End: 2023-01-04

## 2023-01-04 RX ORDER — PROPOFOL 10 MG/ML
INJECTION, EMULSION INTRAVENOUS AS NEEDED
Status: DISCONTINUED | OUTPATIENT
Start: 2023-01-04 | End: 2023-01-04

## 2023-01-04 RX ADMIN — SODIUM CHLORIDE, SODIUM LACTATE, POTASSIUM CHLORIDE, AND CALCIUM CHLORIDE: .6; .31; .03; .02 INJECTION, SOLUTION INTRAVENOUS at 08:32

## 2023-01-04 RX ADMIN — PROPOFOL 150 MG: 10 INJECTION, EMULSION INTRAVENOUS at 08:36

## 2023-01-04 RX ADMIN — LIDOCAINE HYDROCHLORIDE 100 MG: 20 INJECTION, SOLUTION EPIDURAL; INFILTRATION; INTRACAUDAL at 08:36

## 2023-01-04 RX ADMIN — PROPOFOL 50 MG: 10 INJECTION, EMULSION INTRAVENOUS at 08:38

## 2023-01-04 NOTE — ANESTHESIA PREPROCEDURE EVALUATION
Procedure:  EGD    Relevant Problems   CARDIO   (+) Migraine with aura and without status migrainosus, not intractable      GI/HEPATIC   (+) GERD without esophagitis      NEURO/PSYCH   (+) Migraine with aura and without status migrainosus, not intractable   (+) Vision disturbance      Nervous and Auditory   (+) Tic disorder      Other   (+) Obesity due to excess calories, unspecified obesity severity (BMI 40)      Marijuana use, occasional cigarette smoker    Physical Exam    Airway      TM Distance: >3 FB  Neck ROM: full     Dental       Cardiovascular      Pulmonary      Other Findings        Anesthesia Plan  ASA Score- 3     Anesthesia Type- IV sedation with anesthesia with ASA Monitors  Additional Monitors:   Airway Plan:           Plan Factors-Exercise tolerance (METS): >4 METS  Chart reviewed  Existing labs reviewed  Patient summary reviewed  Patient is a current smoker  Patient instructed to abstain from smoking on day of procedure  Patient smoked on day of surgery  Induction- intravenous  Postoperative Plan-     Informed Consent- Anesthetic plan and risks discussed with patient  I personally reviewed this patient with the CRNA  Discussed and agreed on the Anesthesia Plan with the CRNA  Marisol Barry

## 2023-01-04 NOTE — ANESTHESIA PREPROCEDURE EVALUATION
Procedure:  EGD    Relevant Problems   CARDIO   (+) Migraine with aura and without status migrainosus, not intractable      GI/HEPATIC   (+) GERD without esophagitis      NEURO/PSYCH   (+) Migraine with aura and without status migrainosus, not intractable   (+) Vision disturbance      Nervous and Auditory   (+) Tic disorder      Other   (+) Obesity due to excess calories, unspecified obesity severity (BMI 40)      Marijuana use, occasional cigarette smoker    Physical Exam    Airway      TM Distance: >3 FB  Neck ROM: full     Dental       Cardiovascular      Pulmonary      Other Findings        Anesthesia Plan  ASA Score- 3     Anesthesia Type- IV sedation with anesthesia with ASA Monitors  Additional Monitors:   Airway Plan:           Plan Factors-Exercise tolerance (METS): >4 METS  Chart reviewed  Existing labs reviewed  Patient summary reviewed  Patient is a current smoker  Patient instructed to abstain from smoking on day of procedure  Patient did not smoke on day of surgery  Induction- intravenous  Postoperative Plan-     Informed Consent- Anesthetic plan and risks discussed with patient  I personally reviewed this patient with the CRNA  Discussed and agreed on the Anesthesia Plan with the CRNA  Avery Dalal

## 2023-01-04 NOTE — H&P
History and Physical - [facility]  Sydnie Norwood 40 y o  female MRN: [id number]                  HPI: Sydnie Norwood is a 40y o  year old female who presents for EGD for epigastric pain, GERD, nausea      REVIEW OF SYSTEMS: Per the HPI, and otherwise unremarkable  Historical Information   Past Medical History:   Diagnosis Date   • Anxiety    • Depression    • GERD (gastroesophageal reflux disease)    • Hypertension    • No known health problems      Past Surgical History:   Procedure Laterality Date   • NO PAST SURGERIES       Social History   Social History     Substance and Sexual Activity   Alcohol Use Yes   • Alcohol/week: 1 0 standard drink   • Types: 1 Glasses of wine per week     Social History     Substance and Sexual Activity   Drug Use Yes   • Types: Marijuana    Comment: Patient has medical [employer] card     Social History     Tobacco Use   Smoking Status Some Days   • Packs/day: 0 25   • Years: 10 00   • Pack years: 2 50   • Types: Cigarettes   Smokeless Tobacco Never     Family History   Problem Relation Age of Onset   • Breast cancer Mother    • Colon cancer Maternal Uncle    • Ovarian cancer Neg Hx    • Uterine cancer Neg Hx    • Cervical cancer Neg Hx        Meds/Allergies     (Not in a hospital admission)      Allergies   Allergen Reactions   • Azithromycin GI Intolerance       Objective     not currently breastfeeding        PHYSICAL EXAM    Gen: NAD  CV: RRR  CHEST: Clear  ABD: soft, NT/ND  EXT: no edema  Neuro: AAO      ASSESSMENT/PLAN:  This is a 40y o  year old female here for epigastric pain, GERD, nausea    PLAN:   Procedure: EGD

## 2023-01-04 NOTE — ANESTHESIA POSTPROCEDURE EVALUATION
Post-Op Assessment Note    CV Status:  Stable  Pain Score: 0    Pain management: adequate     Mental Status:  Alert and awake   Hydration Status:  Euvolemic   PONV Controlled:  Controlled   Airway Patency:  Patent      Post Op Vitals Reviewed: Yes      Staff: CRNA         No notable events documented      /56 (01/04/23 0847)    Temp 97 9 °F (36 6 °C) (01/04/23 0847)    Pulse 99 (01/04/23 0847)   Resp (!) 10 (01/04/23 0847)    SpO2 98 % (01/04/23 0847)

## 2023-01-04 NOTE — INTERVAL H&P NOTE
H&P reviewed  After examining the patient I find no changes in the patients condition since the H&P had been written      Vitals:    01/04/23 0755   BP: 123/79   Pulse: 89   Resp: 19   Temp: 97 9 °F (36 6 °C)   SpO2: 98%

## 2023-01-06 ENCOUNTER — TELEPHONE (OUTPATIENT)
Dept: HEMATOLOGY ONCOLOGY | Facility: CLINIC | Age: 38
End: 2023-01-06

## 2023-01-06 DIAGNOSIS — R71.8 ANISOCYTOSIS: ICD-10-CM

## 2023-01-06 DIAGNOSIS — D47.1 MYELOPROLIFERATIVE DISORDER (HCC): ICD-10-CM

## 2023-01-06 DIAGNOSIS — D72.829 LEUKOCYTOSIS, UNSPECIFIED TYPE: Primary | ICD-10-CM

## 2023-01-06 DIAGNOSIS — G62.9 NEUROPATHY: ICD-10-CM

## 2023-01-06 DIAGNOSIS — D72.9 NEUTROPHILIA: ICD-10-CM

## 2023-01-09 ENCOUNTER — TELEPHONE (OUTPATIENT)
Dept: HEMATOLOGY ONCOLOGY | Facility: CLINIC | Age: 38
End: 2023-01-09

## 2023-01-09 NOTE — TELEPHONE ENCOUNTER
Multiple dates offered for appointment rescheduling, patient declined  Patient has been rescheduled to 1/24/2022 @ 10:30 AM for virtual visit via [facility]  Advised patient to complete Iron Panel and CBC on or after 1/17/2023  Patient verbalized understanding and agreement         ----- Message from [facility] sent at 1/8/2023  9:35 PM EST -----  Patient did not get labs prior to appointment   Please reschedule    Thanks  RG

## 2023-01-14 DIAGNOSIS — M79.605 PAIN IN BOTH LOWER EXTREMITIES: ICD-10-CM

## 2023-01-14 DIAGNOSIS — M79.604 PAIN IN BOTH LOWER EXTREMITIES: ICD-10-CM

## 2023-01-14 RX ORDER — GABAPENTIN 300 MG/1
CAPSULE ORAL
Qty: 30 CAPSULE | Refills: 0 | Status: SHIPPED | OUTPATIENT
Start: 2023-01-14

## 2023-01-18 DIAGNOSIS — E79.0 ELEVATED BLOOD URIC ACID LEVEL: ICD-10-CM

## 2023-01-18 RX ORDER — ALLOPURINOL 100 MG/1
TABLET ORAL
Qty: 53 TABLET | Refills: 0 | Status: SHIPPED | OUTPATIENT
Start: 2023-01-18

## 2023-01-20 ENCOUNTER — TELEPHONE (OUTPATIENT)
Dept: HEMATOLOGY ONCOLOGY | Facility: CLINIC | Age: 38
End: 2023-01-20

## 2023-01-20 NOTE — TELEPHONE ENCOUNTER
LVM advising patient to complete labs priro to virtual visit with SEBASTIEN Fernández on Tuesday 1/24/2023  Lab hours and [facility] number provided

## 2023-01-23 ENCOUNTER — TELEPHONE (OUTPATIENT)
Dept: HEMATOLOGY ONCOLOGY | Facility: CLINIC | Age: 38
End: 2023-01-23

## 2023-01-23 NOTE — TELEPHONE ENCOUNTER
A called made and a message was left informing the pt that because of missing labs her new appointment will be 2/8/2023 at 10:00am pending labs are available

## 2023-02-08 ENCOUNTER — TELEPHONE (OUTPATIENT)
Dept: HEMATOLOGY ONCOLOGY | Facility: CLINIC | Age: 38
End: 2023-02-08

## 2023-02-22 ENCOUNTER — CONSULT (OUTPATIENT)
Dept: DERMATOLOGY | Facility: CLINIC | Age: 38
End: 2023-02-22

## 2023-02-22 VITALS — TEMPERATURE: 96.5 F | BODY MASS INDEX: 40.16 KG/M2 | WEIGHT: 265 LBS | HEIGHT: 68 IN

## 2023-02-22 DIAGNOSIS — L65.9 ALOPECIA: Primary | ICD-10-CM

## 2023-02-22 DIAGNOSIS — B36.0 TINEA VERSICOLOR: ICD-10-CM

## 2023-02-22 DIAGNOSIS — L82.1 SEBORRHEIC KERATOSIS: ICD-10-CM

## 2023-02-22 DIAGNOSIS — D22.9 MULTIPLE MELANOCYTIC NEVI: ICD-10-CM

## 2023-02-22 DIAGNOSIS — L85.3 XEROSIS OF SKIN: ICD-10-CM

## 2023-02-22 DIAGNOSIS — L81.0 POST-INFLAMMATORY HYPERPIGMENTATION: ICD-10-CM

## 2023-02-22 RX ORDER — HYDROQUINONE 40 MG/G
CREAM TOPICAL
Qty: 28.35 G | Refills: 1 | Status: SHIPPED | OUTPATIENT
Start: 2023-02-22

## 2023-02-22 RX ORDER — KETOCONAZOLE 20 MG/G
CREAM TOPICAL
Qty: 60 G | Refills: 11 | Status: SHIPPED | OUTPATIENT
Start: 2023-02-22

## 2023-02-22 NOTE — PATIENT INSTRUCTIONS
MELANOCYTIC NEVI ("Moles")    Assessment and Plan:  Based on a thorough discussion of this condition and the management approach to it (including a comprehensive discussion of the known risks, side effects and potential benefits of treatment), the patient (family) agrees to implement the following specific plan:  When outside we recommend using a wide brim hat, sunglasses, long sleeve and pants, sunscreen with SPF 91+ with reapplication every 2 hours, or SPF specific clothing   Benign, reassured  Annual skin check     Melanocytic Nevi  Melanocytic nevi ("moles") are tan or brown, raised or flat areas of the skin which have an increased number of melanocytes  Melanocytes are the cells in our body which make pigment and account for skin color  Some moles are present at birth (I e , "congenital nevi"), while others come up later in life (i e , "acquired nevi")  The sun can stimulate the body to make more moles  Sunburns are not the only thing that triggers more moles  Chronic sun exposure can do it too  Clinically distinguishing a healthy mole from melanoma may be difficult, even for experienced dermatologists  The "ABCDE's" of moles have been suggested as a means of helping to alert a person to a suspicious mole and the possible increased risk of melanoma  The suggestions for raising alert are as follows:    Asymmetry: Healthy moles tend to be symmetric, while melanomas are often asymmetric  Asymmetry means if you draw a line through the mole, the two halves do not match in color, size, shape, or surface texture  Asymmetry can be a result of rapid enlargement of a mole, the development of a raised area on a previously flat lesion, scaling, ulceration, bleeding or scabbing within the mole  Any mole that starts to demonstrate "asymmetry" should be examined promptly by a board certified dermatologist      Border: Healthy moles tend to have discrete, even borders    The border of a melanoma often blends into the normal skin and does not sharply delineate the mole from normal skin  Any mole that starts to demonstrate "uneven borders" should be examined promptly by a board certified dermatologist      Color: Healthy moles tend to be one color throughout  Melanomas tend to be made up of different colors ranging from dark black, blue, white, or red  Any mole that demonstrates a color change should be examined promptly by a board certified dermatologist      Diameter: Healthy moles tend to be smaller than 0 6 cm in size; an exception are "congenital nevi" that can be larger  Melanomas tend to grow and can often be greater than 0 6 cm (1/4 of an inch, or the size of a pencil eraser)  This is only a guideline, and many normal moles may be larger than 0 6 cm without being unhealthy  Any mole that starts to change in size (small to bigger or bigger to smaller) should be examined promptly by a board certified dermatologist      Evolving: Healthy moles tend to "stay the same "  Melanomas may often show signs of change or evolution such as a change in size, shape, color, or elevation  Any mole that starts to itch, bleed, crust, burn, hurt, or ulcerate or demonstrate a change or evolution should be examined promptly by a board certified dermatologist           [place] No; NON-INFLAMED    Assessment and Plan:  Based on a thorough discussion of this condition and the management approach to it (including a comprehensive discussion of the known risks, side effects and potential benefits of treatment), the patient (family) agrees to implement the following specific plan:  Monitor for changes  Benign, reassured      Seborrheic Keratosis  A seborrheic keratosis is a harmless warty spot that appears during adult life as a common sign of skin aging  Seborrheic keratoses can arise on any area of skin, covered or uncovered, with the usual exception of the palms and soles  They do not arise from mucous membranes   Seborrheic keratoses can have highly variable appearance  Seborrheic keratoses are extremely common  It has been estimated that over 90% of adults over the age of 61 years have one or more of them  They occur in males and females of all races, typically beginning to erupt in the 35s or 45s  They are uncommon under the age of 21 years  The precise cause of seborrhoeic keratoses is not known  Seborrhoeic keratoses are considered degenerative in nature  As time goes by, seborrheic keratoses tend to become more numerous  Some people inherit a tendency to develop a very large number of them; some people may have hundreds of them  There is no easy way to remove multiple lesions on a single occasion  Unless a specific lesion is "inflamed" and is causing pain or stinging/burning or is bleeding, most insurance companies do not authorize treatment  XEROSIS ("DRY SKIN")    Assessment and Plan:  Based on a thorough discussion of this condition and the management approach to it (including a comprehensive discussion of the known risks, side effects and potential benefits of treatment), the patient (family) agrees to implement the following specific plan:  Use moisturizer like Eucerin,Cerave or Aveeno Cream 3 times a day for the dry skin            Dry skin refers to skin that feels dry to touch  Dry skin has a dull surface with a rough, scaly quality  The skin is less pliable and cracked  When dryness is severe, the skin may become inflamed and fissured  Although any body site can be dry, dry skin tends to affect the shins more than any other site  Dry skin is lacking moisture in the outer horny cell layer (stratum corneum) and this results in cracks in the skin surface  Dry skin is also called xerosis, xeroderma or asteatosis (lack of fat)  It can affect males and females of all ages  There is some racial variability in water and lipid content of the skin    Dry skin that starts in early childhood may be one of about 20 types of ichthyosis (fish-scale skin)  There is often a family history of dry skin  Dry skin is commonly seen in people with atopic dermatitis  Nearly everyone > 60 years has dry skin  Dry skin that begins later may be seen in people with certain diseases and conditions  Postmenopausal women  Hypothyroidism  Chronic renal disease   Malnutrition and weight loss   Subclinical dermatitis   Treatment with certain drugs such as oral retinoids, diuretics and epidermal growth factor receptor inhibitors      What is the treatment for dry skin? The mainstay of treatment of dry skin and ichthyosis is moisturisers/emollients  They should be applied liberally and often enough to:  Reduce itch   Improve the barrier function   Prevent entry of irritants, bacteria   Reduce transepidermal water loss  How can dry skin be prevented? Eliminate aggravating factors:  Reduce the frequency of bathing  A humidifier in winter and air conditioner in summer   Compare having a short shower with a prolonged soak in a bath  Use lukewarm, not hot, water  Replace standard soap with a substitute such as a synthetic detergent cleanser, water-miscible emollient, bath oil, anti-pruritic tar oil, colloidal oatmeal etc    Apply an emollient liberally and often, particularly shortly after bathing, and when itchy  The drier the skin, the thicker this should be, especially on the hands  What is the outlook for dry skin? A tendency to dry skin may persist life-long, or it may improve once contributing factors are controlled  ALOPECIA    Assessment and Plan:  Based on a thorough discussion of this condition and the management approach to it (including a comprehensive discussion of the known risks, side effects and potential benefits of treatment), the patient (family) agrees to implement the following specific plan:  Recommend natural hair styles and stop any hair treatments as this can worsen your hair loss    Discussed punch biopsy  At this time patient will consider biopsy  Please let us know if you decide you would like to have a biopsy done  Recommend hair pieces that are not weaved or glued on  POST-INFLAMMATORY HYPERPIGMENTATION ("PIH")    Assessment and Plan:  Based on a thorough discussion of this condition and the management approach to it (including a comprehensive discussion of the known risks, side effects and potential benefits of treatment), the patient (family) agrees to implement the following specific plan:  Hydroquinone 4% Cream: Apply to left lower leg twice a day for 3 months then take 3 month break  Planning to pay out of pocket    Assessment and Plan:Assessment and Plan  Post-inflammatory hyperpigmentation (PIH) is a temporary darkening of the skin that follows injury such as a cut or burn, or inflammation of the skin following an infection or rash  It can occur in anyone, but most commonly affects darker skin types with greater frequency and severity  Many types of inflammatory skin diseases and injuries can cause PIH, but the most common ones are acne vulgaris, atopic dermatitis, and impetigo  It is due to an overproduction of melanin and uneven transfer of pigment to surrounding keratinocytes  The exact mechanism is unknown, but it is shown to be stimulated by prostanoids, cytokines, chemokines, and other inflammatory mediators  Some medications may also darken postinflammatory pigmentation such as antimalarial drugs, clofazimine, tetracycline, anticancer drugs such as bleomycin, doxorubicin, 5-fluorouracil and busulfan  Postinflammatory hyperpigmented patches are located at the site of original inflammation after the original condition has healed or resolved  They range from light brown to black in color and may become darker if exposed to sunlight and other sources of UV rays   Hyperpigmentation in the dermis has blue-grey appearance and may be permanent or resolve over a protracted period of time if left untreated  The management of PIH should begin by first addressing the underlying inflammatory skin condition  It is important to be mindful that the treatment itself may exacerbate PIH by causing irritation  Treatments include the following:    At least three times a day application of SPF 61+ broad-spectrum sunscreen   Topical depigmenting agents such as hydroxyquinone, azelic acid, vitamin C cream, corticosteroid cream, kojic acid, licorice extract, and retinoids   Chemical peels  Laser treatments and intense pulsed light therapies for epidermal pigmentation can be used with higher risk of aggravating hyperpigmentation

## 2023-03-14 DIAGNOSIS — L81.0 POST-INFLAMMATORY HYPERPIGMENTATION: ICD-10-CM

## 2023-03-14 DIAGNOSIS — E79.0 ELEVATED BLOOD URIC ACID LEVEL: ICD-10-CM

## 2023-03-14 RX ORDER — ALLOPURINOL 100 MG/1
TABLET ORAL
Qty: 53 TABLET | Refills: 0 | Status: SHIPPED | OUTPATIENT
Start: 2023-03-14

## 2023-03-14 RX ORDER — HYDROQUINONE 40 MG/G
CREAM TOPICAL
Qty: 28.35 G | Refills: 1 | OUTPATIENT
Start: 2023-03-14

## 2023-04-05 DIAGNOSIS — E79.0 ELEVATED BLOOD URIC ACID LEVEL: ICD-10-CM

## 2023-04-05 DIAGNOSIS — M79.605 PAIN IN BOTH LOWER EXTREMITIES: ICD-10-CM

## 2023-04-05 DIAGNOSIS — E55.9 VITAMIN D DEFICIENCY: ICD-10-CM

## 2023-04-05 DIAGNOSIS — M79.604 PAIN IN BOTH LOWER EXTREMITIES: ICD-10-CM

## 2023-04-05 DIAGNOSIS — E61.1 IRON DEFICIENCY: ICD-10-CM

## 2023-04-05 RX ORDER — ALLOPURINOL 100 MG/1
TABLET ORAL
Qty: 53 TABLET | Refills: 0 | Status: SHIPPED | OUTPATIENT
Start: 2023-04-05

## 2023-04-05 RX ORDER — GABAPENTIN 300 MG/1
CAPSULE ORAL
Qty: 30 CAPSULE | Refills: 0 | Status: SHIPPED | OUTPATIENT
Start: 2023-04-05

## 2023-04-05 RX ORDER — ERGOCALCIFEROL 1.25 MG/1
CAPSULE ORAL
Qty: 4 CAPSULE | Refills: 1 | Status: SHIPPED | OUTPATIENT
Start: 2023-04-05

## 2023-04-05 RX ORDER — FERROUS SULFATE TAB EC 324 MG (65 MG FE EQUIVALENT) 324 (65 FE) MG
TABLET DELAYED RESPONSE ORAL
Qty: 60 TABLET | Refills: 2 | Status: SHIPPED | OUTPATIENT
Start: 2023-04-05

## 2023-05-12 DIAGNOSIS — E79.0 ELEVATED BLOOD URIC ACID LEVEL: ICD-10-CM

## 2023-05-12 RX ORDER — ALLOPURINOL 100 MG/1
TABLET ORAL
Qty: 53 TABLET | Refills: 0 | Status: SHIPPED | OUTPATIENT
Start: 2023-05-12

## 2023-05-13 DIAGNOSIS — I10 PRIMARY HYPERTENSION: ICD-10-CM

## 2023-05-13 RX ORDER — HYDROCHLOROTHIAZIDE 25 MG/1
25 TABLET ORAL DAILY
Qty: 30 TABLET | Refills: 3 | Status: SHIPPED | OUTPATIENT
Start: 2023-05-13

## 2023-09-18 ENCOUNTER — TELEPHONE (OUTPATIENT)
Age: 38
End: 2023-09-18

## 2025-06-03 NOTE — PROGRESS NOTES
Chief Complaint   Patient presents with    Routine Prenatal Visit    Nausea     vomiting       HPI  Gauri is a  currently at 20w3d who today reports the following:  Contractions - No; Leaking - No; Vaginal bleeding -  No; Heartburn - No.  Nausea and vomiting significantly worsening. She went back to work on , was initially doing ok, but it has slowly gotten worse. On Friday she was sent home from work for n/v. She took paperwork in on Monday but she passed out while sitting down, so her employer told her she could not come back to work until she was much better.     Review of Systems   Constitutional: Negative.    Gastrointestinal:  Positive for constipation, nausea and vomiting.   Neurological: Negative.    Psychiatric/Behavioral: Negative.  Positive for depressed mood.        Objective  /60   Wt 65.3 kg (144 lb)   LMP 2025   BMI 26.34 kg/m²     Physical Exam  Vitals and nursing note reviewed.   Psychiatric:         Mood and Affect: Mood is depressed. Affect is tearful.         Behavior: Behavior normal.         Thought Content: Thought content normal.         Judgment: Judgment normal.         Lab Results   Component Value Date    ABO A 2025    RH Positive 2025    ABSCRN Negative 2025       Assessment and Plan  Diagnoses and all orders for this visit:    1. Supervision of high risk pregnancy, antepartum (Primary)    2. Severe hyperemesis gravidarum    3. Current severe episode of major depressive disorder with psychotic features without prior episode    Other orders  -     sertraline (ZOLOFT) 25 MG tablet; Take 1 tablet by mouth Daily.  Dispense: 90 tablet; Refill: 0  -     famotidine (PEPCID) 40 MG tablet; Take 1 tablet by mouth 2 (Two) Times a Day.  Dispense: 60 tablet; Refill: 2  -     docusate sodium 100 MG capsule; Take 1 capsule by mouth 2 (Two) Times a Day.  Dispense: 60 capsule; Refill: 1    Lengthy discussion with pt and spouse discussing potential plans of  [facility] Note     Patient Name: Albino Puckett  Encounter Date: 2/22/2023     Have you been cared for by a [place] [id number] Dermatologist in the last 3 years and, if so, which description applies to you? NO  I am considered a "new" patient and must complete all patient intake questions  I am FEMALE/of child-bearing potential     REVIEW OF SYSTEMS:  Have you recently had or currently have any of the following? · Recent fever or chills? No  · Any non-healing wound? No  · Are you pregnant or planning to become pregnant? No  · Are you currently or planning to be nursing or breast feeding? No   PAST MEDICAL HISTORY:  Have you personally ever had or currently have any of the following? If "YES," then please provide more detail  · Skin cancer (such as Melanoma, Basal Cell Carcinoma, Squamous Cell Carcinoma? No  · Tuberculosis, HIV/AIDS, Hepatitis B or C: No  · Systemic Immunosuppression such as Diabetes, Biologic or Immunotherapy, Chemotherapy, Organ Transplantation, Bone Marrow Transplantation No  · Radiation Treatment No   FAMILY HISTORY:  Any "first degree relatives" (parent, brother, sister, or child) with the following? • Skin Cancer, Pancreatic or Other Cancer? YES, Breast Cancer: mother, Father: lung cancer   PATIENT EXPERIENCE:    • Do you want the Dermatologist to perform a COMPLETE skin exam today including a clinical examination under the "bra and underwear" areas? NO, buttocks and groin not examined today  • If necessary, do we have your permission to call and leave a detailed message on your Preferred Phone number that includes your specific medical information?   Yes      Allergies   Allergen Reactions   • Azithromycin GI Intolerance      Current Outpatient Medications:   •  allopurinol (ZYLOPRIM) 100 mg tablet, TAKE 1 TABLET BY MOUTH DAILY FOR 7 DAYS, THEN INCREASE TO 2 TABLETS DAILY FOR 23 DAYS , Disp: 53 tablet, Rfl: 0  •  colchicine (COLCRYS) 0 6 mg tablet, Take 2 tablets now Niesha-he will need left ureteroscopy with biopsy of left ureter mass probably in six weeks.  We will need to get clearance for this either before he leaves the hospital or prior to the procedure.    I think it does make sense for him to get another H&P before the procedure given his age and the fact that he has a PE.    He will not be able to stop his anticoagulation.    I would make this a 75 minutes procedure please.    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    He also has an indeterminate left renal lesion and, depending upon what the renal function comes down to, he will either need MRI verses full CT with urogram phase - the reason I would like CT with urogram is not just for the left renal lesion but also to evaluate this ureter lesion just in case it was a false-positive on my retrograde pyelogram.   care. Offered hospital stay with IV hydration and bowel regimen vs going home and being with family and managing physical symptoms outpatient. Additionally discussed going to [facility] ED. She ultimately would like to go home- she will be with family around the clock and not left alone. She has no suicidal ideations or thoughts of self harm today. Recommended she start low dose SSRI to try to help going forward, as well as work on feeling better physically to help with mood. Recommend she plan to be off work for 4 weeks- if things improve sooner, will release her back to work at that time. She and her  voiced understanding and appreciation. They know to message or call with any questions, and to seek care at nearest ED for SI or self harm behaviors.   Bowel regimen- colace 100 mg BID PLUS miralax 17g daily until soft formed stools, then can taper down. Recommended she use ondansetron sparingly, only as needed going forward. Recommended minimum of 64 ounces intake daily which can be water, zero sugar propel, popsicles, jello, ice chips, etc- but recommended she keep something with her at all times as she can. We will have a phone check in on Friday to see how she is doing physically as well as mentally, and she has next appt scheduled for Tues 6/10 for anatomy scan and follow up with Dr Raymundo. She and her  both voiced understanding and appreciation at this plan of care.     Continue with [facility]'s  Prenatal labs reviewed  See above for further instructions  Tests scheduled today for her next visit [facility] for anatomic screening    Return for Next scheduled follow up.    Aleisha Henderson, JOSHUA  Crystal 3, 2025     then 1 tablet an hour later, Disp: 3 tablet, Rfl: 0  •  ergocalciferol (VITAMIN D2) 50,000 units, Take 1 capsule (50,000 Units total) by mouth once a week for 8 doses, Disp: 8 capsule, Rfl: 0  •  ferrous sulfate 324 (65 Fe) mg, Take 1 tablet (324 mg total) by mouth 2 (two) times a day before meals, Disp: 60 tablet, Rfl: 2  •  furosemide (LASIX) 20 mg tablet, Take 20 mg by mouth in the morning, Disp: , Rfl:   •  gabapentin (NEURONTIN) 300 mg capsule, TAKE 1 CAPSULE BY MOUTH AT BEDTIME, Disp: 30 capsule, Rfl: 0  •  hydrochlorothiazide (HYDRODIURIL) 25 mg tablet, Take 1 tablet (25 mg total) by mouth daily, Disp: 30 tablet, Rfl: 3    Current Facility-Administered Medications:   •  cyanocobalamin injection 1,000 mcg, 1,000 mcg, Intramuscular, Weekly, Tiago Messenger SEBASTIEN Tabares, 1,000 mcg at 12/19/22 1127          • Whom besides the patient is providing clinical information about today's encounter?   o NO ADDITIONAL HISTORIAN (patient alone provided history)    Physical Exam and Assessment/Plan by Diagnosis:      MELANOCYTIC NEVI ("Moles")    Physical Exam:  • Anatomic Location Affected:   Mostly on sun-exposed areas of the trunk and extremities  • Morphological Description:  Scattered, 1-4mm round to ovoid, symmetrical-appearing, even bordered, skin colored to dark brown macules/papules, mostly in sun-exposed areas  • Pertinent Positives:  • Pertinent Negatives:     Additional History of Present Condition:      Assessment and Plan:  Based on a thorough discussion of this condition and the management approach to it (including a comprehensive discussion of the known risks, side effects and potential benefits of treatment), the patient (family) agrees to implement the following specific plan:  • When outside we recommend using a wide brim hat, sunglasses, long sleeve and pants, sunscreen with SPF 65+ with reapplication every 2 hours, or SPF specific clothing   • Benign, reassured  • Annual skin check     Melanocytic Nevi  Melanocytic nevi ("moles") are tan or brown, raised or flat areas of the skin which have an increased number of melanocytes  Melanocytes are the cells in our body which make pigment and account for skin color  Some moles are present at birth (I e , "congenital nevi"), while others come up later in life (i e , "acquired nevi")  The sun can stimulate the body to make more moles  Sunburns are not the only thing that triggers more moles  Chronic sun exposure can do it too  Clinically distinguishing a healthy mole from melanoma may be difficult, even for experienced dermatologists  The "ABCDE's" of moles have been suggested as a means of helping to alert a person to a suspicious mole and the possible increased risk of melanoma  The suggestions for raising alert are as follows:    Asymmetry: Healthy moles tend to be symmetric, while melanomas are often asymmetric  Asymmetry means if you draw a line through the mole, the two halves do not match in color, size, shape, or surface texture  Asymmetry can be a result of rapid enlargement of a mole, the development of a raised area on a previously flat lesion, scaling, ulceration, bleeding or scabbing within the mole  Any mole that starts to demonstrate "asymmetry" should be examined promptly by a board certified dermatologist      Border: Healthy moles tend to have discrete, even borders  The border of a melanoma often blends into the normal skin and does not sharply delineate the mole from normal skin  Any mole that starts to demonstrate "uneven borders" should be examined promptly by a board certified dermatologist      Color: Healthy moles tend to be one color throughout  Melanomas tend to be made up of different colors ranging from dark black, blue, white, or red    Any mole that demonstrates a color change should be examined promptly by a board certified dermatologist      Diameter: Healthy moles tend to be smaller than 0 6 cm in size; an exception are "congenital nevi" that can be larger  Melanomas tend to grow and can often be greater than 0 6 cm (1/4 of an inch, or the size of a pencil eraser)  This is only a guideline, and many normal moles may be larger than 0 6 cm without being unhealthy  Any mole that starts to change in size (small to bigger or bigger to smaller) should be examined promptly by a board certified dermatologist      Evolving: Healthy moles tend to "stay the same "  Melanomas may often show signs of change or evolution such as a change in size, shape, color, or elevation  Any mole that starts to itch, bleed, crust, burn, hurt, or ulcerate or demonstrate a change or evolution should be examined promptly by a board certified dermatologist           Quinton Villa; NON-INFLAMED    Physical Exam:  • Anatomic Location Affected:  Trunk, face  • Morphological Description:  Flat and raised, waxy, smooth to warty textured, yellow to brownish-grey to dark brown to blackish, discrete, "stuck-on" appearing papules  • Pertinent Positives:  • Pertinent Negatives: Additional History of Present Condition:      Assessment and Plan:  Based on a thorough discussion of this condition and the management approach to it (including a comprehensive discussion of the known risks, side effects and potential benefits of treatment), the patient (family) agrees to implement the following specific plan:  • Monitor for changes  • Benign, reassured  •     Seborrheic Keratosis  A seborrheic keratosis is a harmless warty spot that appears during adult life as a common sign of skin aging  Seborrheic keratoses can arise on any area of skin, covered or uncovered, with the usual exception of the palms and soles  They do not arise from mucous membranes  Seborrheic keratoses can have highly variable appearance  Seborrheic keratoses are extremely common  It has been estimated that over 90% of adults over the age of 61 years have one or more of them   They occur in males and females of all races, typically beginning to erupt in the 35s or 45s  They are uncommon under the age of 21 years  The precise cause of seborrhoeic keratoses is not known  Seborrhoeic keratoses are considered degenerative in nature  As time goes by, seborrheic keratoses tend to become more numerous  Some people inherit a tendency to develop a very large number of them; some people may have hundreds of them  There is no easy way to remove multiple lesions on a single occasion  Unless a specific lesion is "inflamed" and is causing pain or stinging/burning or is bleeding, most insurance companies do not authorize treatment  XEROSIS ("DRY SKIN")    Physical Exam:  • Anatomic Location Affected:  diffuse  • Morphological Description:  xerosis  • Pertinent Positives:  • Pertinent Negatives: Additional History of Present Condition:      Assessment and Plan:  Based on a thorough discussion of this condition and the management approach to it (including a comprehensive discussion of the known risks, side effects and potential benefits of treatment), the patient (family) agrees to implement the following specific plan:  • Use moisturizer like Eucerin,Cerave or Aveeno Cream 3 times a day for the dry skin   •   •    •     Dry skin refers to skin that feels dry to touch  Dry skin has a dull surface with a rough, scaly quality  The skin is less pliable and cracked  When dryness is severe, the skin may become inflamed and fissured  Although any body site can be dry, dry skin tends to affect the shins more than any other site  Dry skin is lacking moisture in the outer horny cell layer (stratum corneum) and this results in cracks in the skin surface  Dry skin is also called xerosis, xeroderma or asteatosis (lack of fat)  It can affect males and females of all ages  There is some racial variability in water and lipid content of the skin    • Dry skin that starts in early childhood may be one of about 20 types of ichthyosis (fish-scale skin)  There is often a family history of dry skin  • Dry skin is commonly seen in people with atopic dermatitis  • Nearly everyone > 60 years has dry skin  Dry skin that begins later may be seen in people with certain diseases and conditions  • Postmenopausal women  • Hypothyroidism  • Chronic renal disease   • Malnutrition and weight loss   • Subclinical dermatitis   • Treatment with certain drugs such as oral retinoids, diuretics and epidermal growth factor receptor inhibitors      What is the treatment for dry skin? The mainstay of treatment of dry skin and ichthyosis is moisturisers/emollients  They should be applied liberally and often enough to:  • Reduce itch   • Improve the barrier function   • Prevent entry of irritants, bacteria   • Reduce transepidermal water loss  How can dry skin be prevented? Eliminate aggravating factors:  • Reduce the frequency of bathing  • A humidifier in winter and air conditioner in summer   • Compare having a short shower with a prolonged soak in a bath  • Use lukewarm, not hot, water  • Replace standard soap with a substitute such as a synthetic detergent cleanser, water-miscible emollient, bath oil, anti-pruritic tar oil, colloidal oatmeal etc    • Apply an emollient liberally and often, particularly shortly after bathing, and when itchy  The drier the skin, the thicker this should be, especially on the hands  What is the outlook for dry skin? A tendency to dry skin may persist life-long, or it may improve once contributing factors are controlled  ALOPECIA    Physical Exam:  • Anatomic Location Affected:  Frontal hairline  • Morphological Description:  Loss of hair, empty hair follicles   • Pertinent Positives:  • Pertinent Negatives:     Additional History of Present Condition:  Found on exam    Assessment and Plan:  Based on a thorough discussion of this condition and the management approach to it (including a comprehensive discussion of the known risks, side effects and potential benefits of treatment), the patient (family) agrees to implement the following specific plan:  • Recommend natural hair styles and stop any hair treatments as this can worsen your hair loss  • Discussed punch biopsy  At this time patient will consider biopsy  Please let us know if you decide you would like to have a biopsy done  • Recommend hair pieces that are not weaved or glued on  TINEA VERSICOLOR    Physical Exam:  • Anatomic Location Affected:  Back  • Morphological Description:  Lolly Pinks scaly plaques  • Pertinent Positives: KOH positive  • Pertinent Negatives: Additional History of Present Condition:  Found on exam    Assessment and Plan:  Based on a thorough discussion of this condition and the management approach to it (including a comprehensive discussion of the known risks, side effects and potential benefits of treatment), the patient (family) agrees to implement the following specific plan:  • Ketoconazole 2% Cream: Apply topically twice a day as needed to affected area  What is tinea versicolor? Tinea versicolor or pityriasis versicolor is a type of fungal infection on the skin due to a yeast that lives on all of us  It Is due an overgrowth of a type of yeast called Malassezia furfur, which feeds on oils in the skin and thrives in warm, humid environments  Anyone can develop tinea versicolor, but it is more common during the summer months and in tropical climates  Those who tend to sweat more heavily are also at higher risk  Although it is not considered infectious, multiple family members can be affected  - Teens and young adults are most susceptible due to having oily skin   - Affects people of all skin colors   - Weakened immune system predisposes to development     What are the clinical symptoms of tinea versicolor? The first sign of tinea versicolor is often spots on the skin   They can be lighter or darker than surrounding skin, with colors ranging from white, pink, tan, to brown  - The spots are dry, scaly, and sometimes itchy   - Can appear anywhere on the body, but more commonly over the neck, trunk, and arms   - Spots can grow together forming larger patches   - May disappear when temperature drops and return once it becomes warm again  - Pale spots can be confused with vitiligo    How do we diagnose tinea versicolor? Tinea versicolor is usually diagnosed with a history and physical examination  However, the following tests may be useful for confirmation when in doubt  - Wood lamp (black light) examination-- yellow-green glow may be observed in affected areas  - Microscopy using potassium hydroxide (KOH) to examine skin scrapings  - Fungal culture--this is usually reported to be negative, as it is quite difficult to persuade the yeasts to grow in a laboratory  - Skin biopsy--fungal elements may be seen within the outer cells of the skin (stratum corneum) on histopathology    How do we treat tinea versicolor? There are many different options to treat tinea versicolor  The treatment chosen may depend on how thick the spots have grown and how much of the body has been affected  Mild tinea versicolor can be treated with primarily topical antifungal agents  These include:  - Ketoconazole cream/shampoo  - Selenium sulfide   - Terbinafine gel   - Ciclopirox cream/solution   - Propylene glycol solution   - Sodium thiosulphate solution   Topical medications should be applied widely to affected areas before bedtime for between three days to two weeks depending on your dermatologists recommendation    - Use of medicated cleansers once or twice a month may prevent recurrence in those who have has multiple bouts of yeast overgrowth     For extensive skin involvement or after failure of topical medications, oral antifungal agents such as itraconazole and fluconazole can be used   Oral terbinafine used to treat dermatophyte infections is not effective against tinea versicolor    - Vigorous exercise an hour after taking the medication may help sweat it onto the skin surface and enhance clearance of the yeast    POST-INFLAMMATORY HYPERPIGMENTATION ("PIH")    Physical Exam:  • Anatomic Location Affected:   Legs  • Morphological Description:  Hyperpigmented patches  • Pertinent Positives:  • Pertinent Negatives: Additional History of Present Condition:  Patient requesting a bleaching cream      Assessment and Plan:  Based on a thorough discussion of this condition and the management approach to it (including a comprehensive discussion of the known risks, side effects and potential benefits of treatment), the patient (family) agrees to implement the following specific plan:  • Hydroquinone 4% Cream: Apply to left lower leg twice a day for 3 months then take 3 month break  Planning to pay out of pocket    Assessment and Plan:Assessment and Plan  Post-inflammatory hyperpigmentation (PIH) is a temporary darkening of the skin that follows injury such as a cut or burn, or inflammation of the skin following an infection or rash  It can occur in anyone, but most commonly affects darker skin types with greater frequency and severity  Many types of inflammatory skin diseases and injuries can cause PIH, but the most common ones are acne vulgaris, atopic dermatitis, and impetigo  It is due to an overproduction of melanin and uneven transfer of pigment to surrounding keratinocytes  The exact mechanism is unknown, but it is shown to be stimulated by prostanoids, cytokines, chemokines, and other inflammatory mediators  Some medications may also darken postinflammatory pigmentation such as antimalarial drugs, clofazimine, tetracycline, anticancer drugs such as bleomycin, doxorubicin, 5-fluorouracil and busulfan       Postinflammatory hyperpigmented patches are located at the site of original inflammation after the original condition has healed or resolved  They range from light brown to black in color and may become darker if exposed to sunlight and other sources of UV rays  Hyperpigmentation in the dermis has blue-grey appearance and may be permanent or resolve over a protracted period of time if left untreated  The management of PIH should begin by first addressing the underlying inflammatory skin condition  It is important to be mindful that the treatment itself may exacerbate PIH by causing irritation   Treatments include the following:   • At least three times a day application of SPF 98+ broad-spectrum sunscreen   • Topical depigmenting agents such as hydroxyquinone, azelic acid, vitamin C cream, corticosteroid cream, kojic acid, licorice extract, and retinoids   • Chemical peels  • Laser treatments and intense pulsed light therapies for epidermal pigmentation can be used with higher risk of aggravating hyperpigmentation           Scribe Attestation    I,:  Madeline Rodriguez am acting as a scribe while in the presence of the attending physician :       I,:  Alyse Linda MD personally performed the services described in this documentation    as scribed in my presence :